# Patient Record
Sex: MALE | Race: WHITE | NOT HISPANIC OR LATINO | Employment: UNEMPLOYED | URBAN - METROPOLITAN AREA
[De-identification: names, ages, dates, MRNs, and addresses within clinical notes are randomized per-mention and may not be internally consistent; named-entity substitution may affect disease eponyms.]

---

## 2019-09-03 ENCOUNTER — OFFICE VISIT (OUTPATIENT)
Dept: SURGERY | Facility: CLINIC | Age: 59
End: 2019-09-03
Payer: COMMERCIAL

## 2019-09-03 VITALS
HEART RATE: 76 BPM | WEIGHT: 170.44 LBS | HEIGHT: 69 IN | BODY MASS INDEX: 25.24 KG/M2 | SYSTOLIC BLOOD PRESSURE: 133 MMHG | DIASTOLIC BLOOD PRESSURE: 80 MMHG

## 2019-09-03 DIAGNOSIS — K50.019 CROHN'S DISEASE OF ILEUM WITH COMPLICATION: Primary | ICD-10-CM

## 2019-09-03 DIAGNOSIS — K50.012 CROHN'S DISEASE OF ILEUM WITH INTESTINAL OBSTRUCTION: ICD-10-CM

## 2019-09-03 PROCEDURE — 99999 PR PBB SHADOW E&M-NEW PATIENT-LVL III: CPT | Mod: PBBFAC,,, | Performed by: COLON & RECTAL SURGERY

## 2019-09-03 PROCEDURE — 99999 PR PBB SHADOW E&M-NEW PATIENT-LVL III: ICD-10-PCS | Mod: PBBFAC,,, | Performed by: COLON & RECTAL SURGERY

## 2019-09-03 PROCEDURE — 99205 OFFICE O/P NEW HI 60 MIN: CPT | Mod: S$GLB,,, | Performed by: COLON & RECTAL SURGERY

## 2019-09-03 PROCEDURE — 99205 PR OFFICE/OUTPT VISIT, NEW, LEVL V, 60-74 MIN: ICD-10-PCS | Mod: S$GLB,,, | Performed by: COLON & RECTAL SURGERY

## 2019-09-03 RX ORDER — AZATHIOPRINE 50 MG/1
TABLET ORAL
Status: ON HOLD | COMMUNITY
Start: 2019-06-14 | End: 2020-09-08 | Stop reason: HOSPADM

## 2019-09-03 RX ORDER — BUPRENORPHINE 10 UG/H
PATCH TRANSDERMAL
COMMUNITY
Start: 2019-08-19

## 2019-09-03 RX ORDER — HYDROCODONE BITARTRATE AND ACETAMINOPHEN 10; 325 MG/1; MG/1
1 TABLET ORAL EVERY 6 HOURS PRN
Status: ON HOLD | COMMUNITY
End: 2020-10-07 | Stop reason: HOSPADM

## 2019-09-03 NOTE — LETTER
September 3, 2019      Peter J. Bernheim, MD  [address] [facility] [place] [address]           [facility]  [address]  [facility] [address]  Phone: [phone]          Patient: Daquan Jansen   MR Number: [id number]   YOB: 1960   Date of Visit: 9/3/2019       Dear Dr. Peter J. Bernheim:    Thank you for referring Daquan Jansen to me for evaluation. Attached you will find relevant portions of my assessment and plan of care.    If you have questions, please do not hesitate to call me. I look forward to following Daquan Jansen along with you.    Sincerely,    Jorge Alberto Leal MD    Enclosure  CC:  No Recipients    If you would like to receive this communication electronically, please contact [email or web address] or [phone] to request more information on [employer] Link access.    For providers and/or their staff who would like to refer a patient to [facility], please contact us through our one-stop-shop provider referral line, [facility], at [phone].    If you feel you have received this communication in error or would no longer like to receive these types of communications, please e-mail [email or web address]

## 2019-09-03 NOTE — PROGRESS NOTES
CRS Office Visit History and Physical    Referring Md:   Peter J. Bernheim, Md  [address] [place] [facility] [address]    SUBJECTIVE:     Chief Complaint: Crohn's disease with bloating and abdominal pain.    History of Present Illness:  Patient is a 59 y.o. male presents on referral from Peter J Bernheim, MD.  His daughter, Melody, accompanies him to his clinic visit today.    The patient reports that he was diagnosed with Crohn's disease approximately 25 year ago.  His initial presentation was vomiting, weight loss, and abdominal pain.  He was initially treated with corticosteroids and then transitioned to Humira.  Currently, he is on Remicade every 8 weeks and azathioprine.  It is reported that he is prescribed to take 150 mg of azathioprine but he has only been taking 75 mg because he does not think the increased dose made a difference.    Currently, the patient describes symptoms of bloating and midline abdominal pain.  He is usually having 1 bowel movement per day, but intermittent diarrhea consisting of 3-4 semi-formed stools per day with mucous but no blood.  These symptoms have been happening for approximately the last 1 year.  About 1.5 years ago he reports he was admitted to an outside hospital with a flare with symptoms of nausea/vomiting and abdominal pain which was able to resolve with IV corticosteroids followed by a steroid taper.    The patient has not been on prescribed corticosteroids since that time over a year ago, but he does sometimes self take intermittent prednisone including a dose approximately 2 weeks ago that helped alleviate his symptoms.  The patient denies that he has had any recent fevers/chills.    Nutritionally, the patient relates that he his on a self prescribed low fiber diet and has had weight loss of approximately 16 lbs over the last 1 year (185 lbs to 169 lbs)and he feels that he is losing muscle mass.    Functionally, he is on disability due to back and right hip pain for  which he is on chronic opioids.  The patient describes that he can walk 2 city blocks without chest pain but would be unable to walk up a couple of flights of stairs secondary to his musculoskeletal pain.  He does report an episode approximately 9 months ago of chest pain and sweating after activity for which he never had evaluation.  The daughter and patient report he had a cardiac stress test in approximately 2006 which was normal.  He is a former smoker with a 33 pack year history.    Past Medical History:   Diagnosis Date    Arthritis - hip     Chronic pain     Chronic, continuous use of opioids     Crohn's disease     Restless leg syndrome     Spinal stenosis        Past Surgical History:   Procedure Laterality Date    APPENDECTOMY      Open, 29 years ago    LUMBAR FUSION         Review of patient's allergies indicates:   Allergen Reactions    Aspirin Swelling       Current Outpatient Medications on File Prior to Visit   Medication Sig Dispense Refill    azaTHIOprine (IMURAN) 50 mg Tab       buprenorphine (BUTRANS) 10 mcg/hour PTWK       HYDROcodone-acetaminophen (NORCO)  mg per tablet Take 1 tablet by mouth every 6 (six) hours as needed.      infliximab (REMICADE IV) Inject into the vein.       No current facility-administered medications on file prior to visit.        History reviewed. No pertinent family history.    Social History     Tobacco Use    Smoking status: Current Every Day Smoker     Packs/day: 1.50     Years: 22.00     Pack years: 33.00    Smokeless tobacco: Never Used   Substance Use Topics    Alcohol use: Not Currently    Drug use: Not on file        Review of Systems:  ROS:  GENERAL: No fever, chills, fatigability; + weight loss.  Integument:  No rashes, redness, icterus  CHEST: Denies GARCIA, cyanosis, wheezing, cough and sputum production.  CARDIOVASCULAR: + history of chest pain, No PND, orthopnea or reduced exercise tolerance.  GI:  + abd pain, dysphagia, nausea,  "vomiting, no hematemesis, no rectal pain  : Denies burning on urination, no hematuria, no bacteriuria  MSK:  No deformities, swelling, joint pain swelling  Neurologic:  No HAs, seizures, weakness, paresthesias, gait problems      OBJECTIVE:     Vital Signs (Most Recent)  /80 (BP Location: Left arm, Patient Position: Sitting, BP Method: Large (Automatic))   Pulse 76   Ht 5' 9" (1.753 m)   Wt 77.3 kg (170 lb 6.7 oz)   BMI 25.17 kg/m²     Physical Exam:  General: Awake, alert, no distress   Skin: warm and dry  HEENT: anicteric, normocephalic,  Neck trachea midline  Chest symmetric, nl excursions, no retractions  Respiratory: respirations are even and unlabored  COR RRR   Abdomen - inspection reveals well healed surgical scar (appendectomy)  soft mild tenderness in RLQ, non-distended.  Palpable mass in RLQ    Colonoscopy findings 8/14/2019 at outside hospital:   1. Prominent and edematous ileocecal valve without evidence of active inflammation, biopsied  2. Diverticulosis from transverse colon distally and mainly concentrated in the last 20 cm of sigmoid colon  3. Small grade 1 internal hemorrhoids    Pathology 8/14/2019: Benign colonic mucosa with minimal features of chronicity  No evidence of active colitis, dysplasia, or malignancy    ASSESSMENT/PLAN:   1. Crohn's disease and symptomatic stricture of terminal ileum (inflammatory vs fibrostenotic)  2. Medication non-adherence  3. Severe protein-calorie malnutrition with loss of nearly 10% body mass over the last year.    Recommend  1. Obtain MRE to evaluate for active inflammation vs fibrostenotic disease and assess for synchronous lesions  2. Obtain nutrition labs (Prealbumin and CMP) and inflammatory markers (CRP, CBC) to corroborate clinic evidence of malnutrition and assess inflammatory vs fibrostenotic disease.  3. Start high protein nutritional supplements and multivitamin  4. Follow up in 1-2 weeks    I have personally obtained a history and " performed a physical exam with and independent to my resident and discussed the findings and plan with the patient.  I agree with the above findings and plan with the following exceptions:  None    H, Jorge Alberto Arreola MD, FACS, FASCRS  Staff Surgeon  Dept of Colon and Rectal Surgery  [facility] [place], [place]

## 2019-09-11 ENCOUNTER — OFFICE VISIT (OUTPATIENT)
Dept: SURGERY | Facility: CLINIC | Age: 59
End: 2019-09-11
Payer: COMMERCIAL

## 2019-09-11 ENCOUNTER — HOSPITAL ENCOUNTER (OUTPATIENT)
Dept: RADIOLOGY | Facility: HOSPITAL | Age: 59
Discharge: HOME | End: 2019-09-11
Attending: COLON & RECTAL SURGERY
Payer: COMMERCIAL

## 2019-09-11 VITALS
HEART RATE: 80 BPM | HEIGHT: 69 IN | SYSTOLIC BLOOD PRESSURE: 116 MMHG | BODY MASS INDEX: 25.08 KG/M2 | DIASTOLIC BLOOD PRESSURE: 72 MMHG | WEIGHT: 169.31 LBS

## 2019-09-11 DIAGNOSIS — K50.019 CROHN'S DISEASE OF ILEUM WITH COMPLICATION: Primary | ICD-10-CM

## 2019-09-11 DIAGNOSIS — K50.019 CROHN'S DISEASE OF ILEUM WITH COMPLICATION: ICD-10-CM

## 2019-09-11 PROCEDURE — 99999 PR PBB SHADOW E&M-EST. PATIENT-LVL III: CPT | Mod: PBBFAC,,, | Performed by: COLON & RECTAL SURGERY

## 2019-09-11 PROCEDURE — 99213 PR OFFICE/OUTPT VISIT, EST, LEVL III, 20-29 MIN: ICD-10-PCS | Mod: S$GLB,,, | Performed by: COLON & RECTAL SURGERY

## 2019-09-11 PROCEDURE — 99999 PR PBB SHADOW E&M-EST. PATIENT-LVL III: ICD-10-PCS | Mod: PBBFAC,,, | Performed by: COLON & RECTAL SURGERY

## 2019-09-11 PROCEDURE — A9585 GADOBUTROL INJECTION: HCPCS | Performed by: COLON & RECTAL SURGERY

## 2019-09-11 PROCEDURE — 72197 MRI PELVIS W/O & W/DYE: CPT | Mod: TC

## 2019-09-11 PROCEDURE — 74183 MRI ABD W/O CNTR FLWD CNTR: CPT | Mod: 26,,, | Performed by: RADIOLOGY

## 2019-09-11 PROCEDURE — 72197 MRI ENTEROGRAPHY: ICD-10-PCS | Mod: 26,,, | Performed by: RADIOLOGY

## 2019-09-11 PROCEDURE — 74183 MRI ENTEROGRAPHY: ICD-10-PCS | Mod: 26,,, | Performed by: RADIOLOGY

## 2019-09-11 PROCEDURE — 25500020 PHARM REV CODE 255: Performed by: COLON & RECTAL SURGERY

## 2019-09-11 PROCEDURE — 72197 MRI PELVIS W/O & W/DYE: CPT | Mod: 26,,, | Performed by: RADIOLOGY

## 2019-09-11 PROCEDURE — 99213 OFFICE O/P EST LOW 20 MIN: CPT | Mod: S$GLB,,, | Performed by: COLON & RECTAL SURGERY

## 2019-09-11 RX ORDER — GADOBUTROL 604.72 MG/ML
10 INJECTION INTRAVENOUS
Status: COMPLETED | OUTPATIENT
Start: 2019-09-11 | End: 2019-09-11

## 2019-09-11 RX ADMIN — GADOBUTROL 10 ML: 604.72 INJECTION INTRAVENOUS at 01:09

## 2019-09-11 NOTE — PROGRESS NOTES
HPI:  Daquan Jansen is a 59 y.o. male with history of Crohn's ileitis who presents in follow-up after obtaining inflammatory labs and MRE.    The patient was referred from Peter J Bernheim, MD.  His son-in-law, accompanies him to his clinic visit today.     From previous visit:  He was diagnosed with Crohn's disease approximately 25 year ago.  His initial presentation was vomiting, weight loss, and abdominal pain.  He was initially treated with corticosteroids and then transitioned to Humira.  Currently, he is on Remicade every 8 weeks and azathioprine.  It is reported that he is prescribed to take 150 mg of azathioprine but he has only been taking 75 mg because he does not think the increased dose made a difference.     Currently, the patient describes symptoms of bloating and midline abdominal pain.  He is usually having 1 bowel movement per day, but intermittent diarrhea consisting of 3-4 semi-formed stools per day with mucous but no blood.  These symptoms have been happening for approximately the last 1 year.  About 1.5 years ago he reports he was admitted to an outside hospital with a flare with symptoms of nausea/vomiting and abdominal pain which was able to resolve with IV corticosteroids followed by a steroid taper.     The patient has not been on prescribed corticosteroids since that time over a year ago, but he does sometimes self take intermittent prednisone including a dose approximately 2 weeks ago that helped alleviate his symptoms.  The patient denies that he has had any recent fevers/chills.     Nutritionally, the patient relates that he his on a self prescribed low fiber diet and has had weight loss of approximately 16 lbs over the last 1 year (185 lbs to 169 lbs)and he feels that he is losing muscle mass.     Functionally, he is on disability due to back and right hip pain for which he is on chronic opioids.  The patient describes that he can walk 2 city blocks without chest pain but would be  unable to walk up a couple of flights of stairs secondary to his musculoskeletal pain.  He does report an episode approximately 9 months ago of chest pain and sweating after activity for which he never had evaluation.  The daughter and patient report he had a cardiac stress test in approximately 2006 which was normal.  He is a former smoker with a 33 pack year history.     Interval update:  Symptoms of abdominal pain and bloating are stable.  He is not having any vomiting.  He continues to have mucoid stools.  The patient has not started taking nutritional supplements.  He continues to take 1/2 his prescribed azathioprine dose.  He denies taking any additional unprescribed corticosteroids.      Past Medical History:   Diagnosis Date    Arthritis - hip     Chronic pain     Chronic, continuous use of opioids     Crohn's disease     Restless leg syndrome     Spinal stenosis         Past Surgical History:   Procedure Laterality Date    APPENDECTOMY      Open, 29 years ago    LUMBAR FUSION         Review of patient's allergies indicates:   Allergen Reactions    Aspirin Swelling       No family history on file.    Social History     Socioeconomic History    Marital status: Unknown     Spouse name: Not on file    Number of children: Not on file    Years of education: Not on file    Highest education level: Not on file   Occupational History    Not on file   Social Needs    Financial resource strain: Not on file    Food insecurity:     Worry: Not on file     Inability: Not on file    Transportation needs:     Medical: Not on file     Non-medical: Not on file   Tobacco Use    Smoking status: Former Smoker     Packs/day: 1.50     Years: 22.00     Pack years: 33.00    Smokeless tobacco: Current User     Types: Chew   Substance and Sexual Activity    Alcohol use: Not Currently    Drug use: Not on file    Sexual activity: Not on file   Lifestyle    Physical activity:     Days per week: Not on file      "Minutes per session: Not on file    Stress: Not on file   Relationships    Social connections:     Talks on phone: Not on file     Gets together: Not on file     Attends [employer] service: Not on file     Active member of club or organization: Not on file     Attends meetings of clubs or organizations: Not on file     Relationship status: Not on file   Other Topics Concern    Not on file   Social History Narrative    Not on file       ROS:  GENERAL: No fever, chills, fatigability or weight loss.  Integument: No rashes, redness, icterus  CHEST: Denies GARCIA, cyanosis, wheezing, cough and sputum production.  CARDIOVASCULAR: Denies chest pain, PND, orthopnea or reduced exercise tolerance.  GI: Denies abd pain, dysphagia, nausea, vomiting, no hematemesis   : Denies burning on urination, no hematuria, no bacteriuria  MSK: No deformities, swelling, joint pain swelling  Neurologic: No HAs, seizures, weakness, paresthesias, gait problems    PE:  General appearance Awake, alert, no distress  /72 (BP Location: Left arm, Patient Position: Sitting, BP Method: Large (Automatic))   Pulse 80   Ht 5' 9" (1.753 m)   Wt 76.8 kg (169 lb 5 oz)   BMI 25.00 kg/m²   Skin warm and dry  AT NC EOMI  Neck supple trachea midline   Chest symmetric, nl excursion, no retractions, breathing comfortably  EXT - no CCE  Neuro:  Mood/ affect nl, alert and oriented x 3, moves all ext's, gait nl    LABS:  WBC 5.3K  Hgb 14.8  CRP 3.0  Pre-albumin 32  Albumin 4.4    IMAGING:  MR enterography:     Impression       "Short segment of mild wall thickening with mucosal enhancement of the terminal ileum, possible low-grade enteritis/ active Crohn's disease.  No fibrostenotic component, fistula, or fluid collections."    Electronically signed by resident: Gary Mahoney MD  Date: 09/11/2019  Time: 14:38    Electronically signed by: Wade Boateng MD  Date: 09/11/2019  Time: 14:54         Assessment:  1. Crohn's ileitis with symptomatic stricture - mild " wall thickening, possible inflammatory given mucosal enhancement.  No evidence of true stricture.  No evidence proximal bowel dilatation.  Normal CRP notwithstanding.   Prior medical tx - Humira in past; azathioprine, and Remicaide most recently   2.  Wt loss with preserved prealbumin and albumin.      Plan:  I discussed MR findings with patient and son.  Given mild wall thickening without proximal dilatation and given the absence of true fibrotic stenosis on MRE I favor consideration of optimizing medical therapy.    Discussed with Dr Flower who will review chart and consider office consultation.

## 2019-09-23 ENCOUNTER — TELEPHONE (OUTPATIENT)
Dept: GASTROENTEROLOGY | Facility: CLINIC | Age: 59
End: 2019-09-23

## 2019-09-23 ENCOUNTER — TELEPHONE (OUTPATIENT)
Dept: SURGERY | Facility: CLINIC | Age: 59
End: 2019-09-23

## 2019-09-23 NOTE — TELEPHONE ENCOUNTER
Called & spoke to pt. Informed him that the overall game plan is for him to be scoped w/ a f/u 2 weeks after. Requested that pt reach out to Dr Arreola's team for endo orders. Pt expressed understanding.

## 2019-09-23 NOTE — TELEPHONE ENCOUNTER
----- Message from Ciara Masterson sent at 9/23/2019  2:34 PM CDT -----  Contact: pt  Reason:Pt spoke with the gastro dept pertaining to a scope he was told to reach back out to this dept please call to advise. Thanks    Communication:[phone]

## 2019-10-17 DIAGNOSIS — K50.919 CROHN'S DISEASE WITH COMPLICATION, UNSPECIFIED GASTROINTESTINAL TRACT LOCATION: Primary | ICD-10-CM

## 2019-11-01 ENCOUNTER — TELEPHONE (OUTPATIENT)
Dept: GASTROENTEROLOGY | Facility: CLINIC | Age: 59
End: 2019-11-01

## 2019-11-01 DIAGNOSIS — K50.919 CROHN'S DISEASE WITH COMPLICATION, UNSPECIFIED GASTROINTESTINAL TRACT LOCATION: Primary | ICD-10-CM

## 2019-11-01 DIAGNOSIS — Z12.11 SPECIAL SCREENING FOR MALIGNANT NEOPLASMS, COLON: Primary | ICD-10-CM

## 2019-11-01 RX ORDER — POLYETHYLENE GLYCOL 3350, SODIUM SULFATE ANHYDROUS, SODIUM BICARBONATE, SODIUM CHLORIDE, POTASSIUM CHLORIDE 236; 22.74; 6.74; 5.86; 2.97 G/4L; G/4L; G/4L; G/4L; G/4L
4 POWDER, FOR SOLUTION ORAL ONCE
Qty: 4000 ML | Refills: 0 | Status: SHIPPED | OUTPATIENT
Start: 2019-11-01 | End: 2019-11-01

## 2019-11-01 NOTE — TELEPHONE ENCOUNTER
Called & spoke to pt. Scheduled him for 12/4 at 11:00 am w/ 10:30 am arrival. Pt expressed understanding.

## 2019-11-01 NOTE — TELEPHONE ENCOUNTER
----- Message from Tereza Fortune sent at 11/1/2019 12:15 PM CDT -----  Contact: pt  Pt needs to schedule a appt with Dr Flower.  Dr Arreola referred him to Dr. Flower. If pt maritza not answer please leave a message.

## 2019-11-01 NOTE — TELEPHONE ENCOUNTER
Called & spoke to pt. Informed him that Dr Flower would like to scope him prior to an appt. Pt expressed understanding. Orders placed, purple sheet reviewed. Transferred pt to [id number] w/ directions to call me back on direct line once scheduled to set up f/u [id number]. Pt expressed understanding.

## 2019-11-01 NOTE — TELEPHONE ENCOUNTER
----- Message from Juli De La Cruz MA sent at 11/1/2019  4:07 PM CDT -----  Contact: [phone]  Pt wants to give some updated medial information before his procedure on 11/14/19.     [phone]

## 2019-11-01 NOTE — TELEPHONE ENCOUNTER
----- Message from Lacie Middleton sent at 11/1/2019  2:35 PM CDT -----  Contact: pt at [phone]  Ching hn-[facility] is calling to set up and appt with Dr. Flower

## 2019-11-04 ENCOUNTER — TELEPHONE (OUTPATIENT)
Dept: ENDOSCOPY | Facility: HOSPITAL | Age: 59
End: 2019-11-04

## 2019-11-04 NOTE — TELEPHONE ENCOUNTER
Attempted to reach patient to discuss regarding message below.   No answer and message was left for him to return call to Endoscopy Scheduling Department at [phone]        Mindi Vargas RN   to [facility] Endo Schedulers            11/1/19 4:25 PM   Please give pt a call. States he has updated medical information he needs to disclose prior to scope. Thanks!

## 2020-07-20 ENCOUNTER — TELEPHONE (OUTPATIENT)
Dept: ENDOSCOPY | Facility: HOSPITAL | Age: 60
End: 2020-07-20

## 2020-07-20 NOTE — TELEPHONE ENCOUNTER
Basilio Mindi,    Looks like patient was going to be a new patient with yall (see note 11/1/2019) from a referral from Dr. Arreola, he was going to be scoped by Dr. Flower then see her in clinic 2 weeks after.  He ended up having to cancelled his colonoscopy bc his insurance wouldn't cover it, He now has Medicare and wants to start the process over.    How does this Work with huong?    Thanks,  Shasta

## 2020-07-20 NOTE — TELEPHONE ENCOUNTER
----- Message from Molly Linares RN sent at 7/20/2020  9:41 AM CDT -----  Regarding: FW: Ravi    ----- Message -----  From: Danielle Alonzo  Sent: 7/20/2020   9:37 AM CDT  To: [facility] Staff  Subject: Appt                                             Pt is calling because his insurance would not pay for a procedure, Colonoscopy in the past but now has Medicare that will pay for it.  He is requesting a returned call to be scheduled for the procedure because he says his stomach is blocked up and hurting.      He can be reached at [phone].    Thank you.

## 2020-07-21 ENCOUNTER — TELEPHONE (OUTPATIENT)
Dept: SURGERY | Facility: CLINIC | Age: 60
End: 2020-07-21

## 2020-07-21 NOTE — TELEPHONE ENCOUNTER
----- Message from Miriam Velázquez sent at 7/21/2020  7:36 AM CDT -----  Regarding: speak w/ the nurse  Pt requesting to speak w/ the Nurse    Please call and advise    Phone [phone]

## 2020-07-21 NOTE — TELEPHONE ENCOUNTER
Pt says his Crohns is flaring up. Told him he will have to see Dr Flower. He will call that dept to get an appt.

## 2020-07-21 NOTE — TELEPHONE ENCOUNTER
----- Message from Juli De La Cruz MA sent at 7/21/2020  8:33 AM CDT -----  Contact: [phone]  Returning a call to Ludmila. Pt said that he would like to discuss a Chron's flare up that had him up all night [phone]

## 2020-07-21 NOTE — TELEPHONE ENCOUNTER
Left message to call Dr Flower's office if he is calling about scheduling the colonoscopy because Dr Arreola referred him to GI. If he has a question for me asked that he please call me back.

## 2020-08-03 RX ORDER — OMEPRAZOLE 40 MG/1
40 CAPSULE, DELAYED RELEASE ORAL
Status: ON HOLD | COMMUNITY
End: 2020-09-08 | Stop reason: HOSPADM

## 2020-08-03 RX ORDER — CODEINE PHOSPHATE AND GUAIFENESIN 10; 100 MG/5ML; MG/5ML
SOLUTION ORAL
Status: ON HOLD | COMMUNITY
Start: 2015-04-10 | End: 2020-09-08 | Stop reason: HOSPADM

## 2020-08-03 RX ORDER — TRAMADOL HYDROCHLORIDE 50 MG/1
50 TABLET ORAL
Status: ON HOLD | COMMUNITY
End: 2020-09-08 | Stop reason: HOSPADM

## 2020-08-03 RX ORDER — OMEPRAZOLE 20 MG/1
20 CAPSULE, DELAYED RELEASE ORAL
Status: ON HOLD | COMMUNITY
End: 2020-09-08 | Stop reason: HOSPADM

## 2020-08-03 RX ORDER — HYDROCODONE BITARTRATE AND ACETAMINOPHEN 7.5; 325 MG/1; MG/1
1 TABLET ORAL
Status: ON HOLD | COMMUNITY
End: 2020-09-08 | Stop reason: HOSPADM

## 2020-08-03 RX ORDER — ADALIMUMAB 40MG/0.8ML
40 KIT SUBCUTANEOUS
Status: ON HOLD | COMMUNITY
End: 2020-09-08 | Stop reason: HOSPADM

## 2020-08-03 RX ORDER — PREDNISONE 10 MG/1
10 TABLET ORAL
Status: ON HOLD | COMMUNITY
End: 2020-10-07 | Stop reason: HOSPADM

## 2020-08-25 ENCOUNTER — OFFICE VISIT (OUTPATIENT)
Dept: GASTROENTEROLOGY | Facility: HOSPITAL | Age: 60
End: 2020-08-25
Attending: INTERNAL MEDICINE
Payer: MEDICARE

## 2020-08-25 ENCOUNTER — TELEPHONE (OUTPATIENT)
Dept: ENDOSCOPY | Facility: HOSPITAL | Age: 60
End: 2020-08-25

## 2020-08-25 VITALS
DIASTOLIC BLOOD PRESSURE: 86 MMHG | WEIGHT: 160.25 LBS | HEART RATE: 66 BPM | SYSTOLIC BLOOD PRESSURE: 132 MMHG | HEIGHT: 69 IN | RESPIRATION RATE: 16 BRPM | OXYGEN SATURATION: 99 % | TEMPERATURE: 99 F | BODY MASS INDEX: 23.74 KG/M2

## 2020-08-25 DIAGNOSIS — R10.31 ABDOMINAL PAIN, RLQ (RIGHT LOWER QUADRANT): ICD-10-CM

## 2020-08-25 DIAGNOSIS — K50.012 CROHN'S DISEASE OF SMALL INTESTINE WITH INTESTINAL OBSTRUCTION: Primary | ICD-10-CM

## 2020-08-25 DIAGNOSIS — Z12.11 SPECIAL SCREENING FOR MALIGNANT NEOPLASMS, COLON: Primary | ICD-10-CM

## 2020-08-25 DIAGNOSIS — Z01.818 PRE-OP TESTING: Primary | ICD-10-CM

## 2020-08-25 PROCEDURE — 99204 OFFICE O/P NEW MOD 45 MIN: CPT | Mod: ,,, | Performed by: INTERNAL MEDICINE

## 2020-08-25 PROCEDURE — 99204 PR OFFICE/OUTPT VISIT, NEW, LEVL IV, 45-59 MIN: ICD-10-PCS | Mod: ,,, | Performed by: INTERNAL MEDICINE

## 2020-08-25 PROCEDURE — 3008F BODY MASS INDEX DOCD: CPT | Mod: CPTII,,, | Performed by: INTERNAL MEDICINE

## 2020-08-25 PROCEDURE — 3008F PR BODY MASS INDEX (BMI) DOCUMENTED: ICD-10-PCS | Mod: CPTII,,, | Performed by: INTERNAL MEDICINE

## 2020-08-25 RX ORDER — ACETAMINOPHEN 500 MG
1 TABLET ORAL DAILY
COMMUNITY

## 2020-08-25 RX ORDER — ACETAMINOPHEN 160 MG/5ML
200 SUSPENSION, ORAL (FINAL DOSE FORM) ORAL DAILY
COMMUNITY
End: 2020-10-21

## 2020-08-25 RX ORDER — POLYETHYLENE GLYCOL 3350, SODIUM SULFATE ANHYDROUS, SODIUM BICARBONATE, SODIUM CHLORIDE, POTASSIUM CHLORIDE 236; 22.74; 6.74; 5.86; 2.97 G/4L; G/4L; G/4L; G/4L; G/4L
4 POWDER, FOR SOLUTION ORAL ONCE
Qty: 4000 ML | Refills: 0 | Status: SHIPPED | OUTPATIENT
Start: 2020-08-25 | End: 2020-08-25

## 2020-08-25 NOTE — LETTER
August 25, 2020      DIAN Arreola MD  [address]  [facility] [address]           [facility]  [address]  [facility] [address]  Phone: [phone]  Fax: [phone]          Patient: Daquan Jansen   MR Number: [id number]   YOB: 1960   Date of Visit: 8/25/2020       Dear Dr. DIAN Arreola:    Thank you for referring Daquan Jansen to me for evaluation. Attached you will find relevant portions of my assessment and plan of care.    If you have questions, please do not hesitate to call me. I look forward to following Daquan Jansen along with you.    Sincerely,    Eduin Allan MD    Enclosure  CC:  No Recipients    If you would like to receive this communication electronically, please contact [email or web address] or [phone] to request more information on [employer] Link access.    For providers and/or their staff who would like to refer a patient to [facility], please contact us through our one-stop-shop provider referral line, [facility], at [phone].    If you feel you have received this communication in error or would no longer like to receive these types of communications, please e-mail [email or web address]

## 2020-08-25 NOTE — PROGRESS NOTES
[facility]          Inflammatory Bowel Disease New Patient Consultation Note         TODAY'S VISIT DATE:  8/25/2020    Reason for Consult:    Chief Complaint   Patient presents with    Crohn's Disease       PCP: Brayan Serrano      Referring MD:   Dr. DIAN Arreola    History of Present Illness:  Daquan Jansen who is a 59 y.o. male is being seen today at the [facility] on 08/25/2020 for inflammatory bowel disease- Crohn's disease.  He is here today for further evaluation of his history of Crohn's disease.  He has had Crohn's for a long time.  See details below.  He was supposed to have hip surgery recently but because of his ongoing gastrointestinal problems his surgeon delayed his procedure.  He reports for the last several months that he has right lower quadrant pain that can be very severe after almost every meal.  He also notes chronic issues with abdominal bloating and swelling after meals.  He denies any diarrhea and reports that he only has a bowel movement about once a day to once every 3 or 4 days and they are typically formed but sometimes can be soft.  He rarely has any blood in his stools.  He denies any vomiting but does report some mild nausea.  His biggest complaint is of the lower abdominal pain that has been persistent since his diagnosis in 1995.  He reports that sometimes when he has been on medical therapy this symptoms have improved somewhat.    IBD History:  He has a history of ileal Crohn's disease that was diagnosed in the mid 90s.  As far back as 1999 colonoscopy reports have noted a stricture at the ileocecal valve that will not allow even a pediatric colonoscope to pass into the terminal ileum.  His medical treatment has been somewhat erratic because of lapses in insurance coverage over the years.  He has had good response is to steroids in the past.  After being on steroids he was placed on azathioprine and  infliximab.  He responded well to infliximab initially but after several months he felt like it was not helping anymore and he was having a return of some of his abdominal pain.  He was then started on Humira.  He had an initial response to this as well but later felt like he lost response.  He went for several years without being on any medical therapy. In early 2018 he was started on azathioprine and later started on infliximab (2nd time this drug was used).    It is noted that when he was restarted on infliximab he did have a minor infusion reaction during his 3rd infusion but had never had any other issues since that time.    Based on the notes it sounds like he had a good response to this therapy although there were some issues with low 6 TG levels concerning for medication non adherence.  In August of 2019 he had another colonoscopy that again revealed edema of the ileocecal valve and stenosis that did not allow for passage of the colonoscope into the ileum.  He also had diverticulosis from the transverse colon distally and some small hemorrhoids.  Biopsies of the ileocecal valve did not show any active inflammatory changes.  In September 2019 he was referred to colorectal surgery for possible resection of the IC valve stricture.  An MR enterography was done which showed some changes concerning for active inflammatory disease but there was no evidence of proximal dilation of the bowel and no clear evidence of a stricture.  He was referred for further medical management but he never came to appointment.  He was seen by his primary gastroenterologist in April of this year and it was noted that he had been off of infliximab for for the last 4 months.  As of June it does not appear that this had been restarted.  Infliximab was restarted about 2 weeks ago at a dose of 5 milligrams/kilogram.  He is scheduled for his next dose in 8 weeks (not really loaded).    IBD Details:  Dx Date:  1995  Disease type/distribution:   Crohn's disease/Ileal disease  Current Treatment:  Infliximab  Start Date:  August 2020 Response:  Unclear  Optimized:  No  Adverse reactions:  None  Prior surgeries:  None  CRP Elevation: N  Disease Complications:  IC valve stricture  Extraintestinal manifestations:  None  Prior treatments:   Steroids:  Good response but not with most recent round of prednisone  5ASA:  Was on sulfasalazine at 1 point without response  IMM:  Azathioprine-no significant issues, not taking currently-normal TPMT activity  TNF Inh:  Previously on Humira-loss of response (not clear of antibodies checked); infliximab-current therapy-2 previous courses-unclear whether he has antibodies   Anti-Integrin:  None   IL 12/23:  None  KYLEIGH Inh:  None    Previous Clinical Trials:  None    Last Colonoscopy:  August 2019-IC valve stricture    Other Endoscopies:  EGD many years ago    Imaging:   MRE:  September 2019-inflammation of a 4 cm segment of the terminal ileum, no upstream dilation, no clear fibrotic stricture   CT:  None   Other:  None    Pertinent Labs:  Lab Results   Component Value Date    CRP 3.0 09/03/2019     No results found for: TTGIGA, IGA  No results found for: TSH, FREET4  No results found for: WJFZSGDQ36KV, CDCNZOTR59  No results found for: HEPBSAG, HEPBCAB, HEPCAB  No results found for: [id number]  No results found for: NIL, TBAG, TBAGNIL, MITOGENNIL, TBGOLD, TSPOTSCREN  No results found for: TPTMINTERP, TPMTRESULT  No results found for: STOOLCULTURE, FCCJBUEEVM6Q, IHMVTAFERW1Y, CDIFFICILEAN, CDIFFTOX, CDIFFICILEBY  No results found for: CALPROTECTIN    Therapeutic Drug Monitoring Labs:  No results found for: PROMETH  No results found for: ANSADAINIT, INFLIXIMAB, INFLIXINTERP    Vaccinations:  No results found for: HEPBSAB  No results found for: HEPAIGG  No results found for: VARICELLAZOS, VARICELLAINT    There is no immunization history on file for this patient.      Review of Systems  Review of Systems   Constitutional: Positive  for chills and weight loss. Negative for fever.   HENT: Negative for sore throat.    Eyes: Positive for redness. Negative for pain.   Respiratory: Negative for cough and shortness of breath.    Cardiovascular: Negative for chest pain.   Gastrointestinal: Positive for abdominal pain, heartburn and nausea. Negative for blood in stool, constipation, diarrhea and vomiting.   Genitourinary: Negative for hematuria.   Musculoskeletal: Negative for back pain.   Skin: Negative for rash.   Neurological: Negative for focal weakness.   Psychiatric/Behavioral: Negative for depression. The patient is not nervous/anxious.        Medical History:   Past Medical History:   Diagnosis Date    Arthritis - hip     Chronic pain     Chronic, continuous use of opioids     Colon polyp     Doctor said had a few small ones nothing to worry about    Crohn's disease     Food intolerance     Anything with seeds    Hyperlipemia     Restless leg syndrome     Spinal stenosis        Surgical History:  Past Surgical History:   Procedure Laterality Date    APPENDECTOMY      Open, 29 years ago    COLONOSCOPY  Had a lot of them    None    LUMBAR FUSION      UPPER GASTROINTESTINAL ENDOSCOPY  Dont remember    In hospital recordes       Family History:   Family History   Problem Relation Age of Onset    Heart disease Mother     Bone cancer Father     Diabetes Brother        Social History:   Social History     Tobacco Use    Smoking status: Former Smoker     Packs/day: 1.50     Years: 45.00     Pack years: 67.50     Types: Cigarettes, Cigars     Start date: 1975     Quit date: 2002     Years since quittin.0    Smokeless tobacco: Current User     Types: Chew    Tobacco comment: Still dip   Substance Use Topics    Alcohol use: Never    Drug use: Never       Allergies: Reviewed    Home Medications:   Medication List with Changes/Refills   Current Medications    ADALIMUMAB (HUMIRA) 40 MG/0.8 ML SYKT    Inject 40 mg  "into the skin.    AZATHIOPRINE (IMURAN) 50 MG TAB        BUPRENORPHINE (BUTRANS) 10 MCG/HOUR PTWK        CHOLECALCIFEROL, VITAMIN D3, (VITAMIN D3) 50 MCG (2,000 UNIT) CAP    Take 1 capsule by mouth once daily.    COENZYME Q10 200 MG CAPSULE    Take 200 mg by mouth once daily.    GUAIFENESIN-CODEINE 100-10 MG/5 ML (TUSSI-ORGANIDIN NR)  MG/5 ML SYRUP    5 to 10 ml q 6 hrs prn    HYDROCODONE-ACETAMINOPHEN (NORCO)  MG PER TABLET    Take 1 tablet by mouth every 6 (six) hours as needed.    HYDROCODONE-ACETAMINOPHEN (NORCO) 7.5-325 MG PER TABLET    Take 1 tablet by mouth.    INFLIXIMAB (REMICADE IV)    Inject into the vein.    OMEPRAZOLE (PRILOSEC) 20 MG CAPSULE    Take 20 mg by mouth.    OMEPRAZOLE (PRILOSEC) 40 MG CAPSULE    Take 40 mg by mouth.    PREDNISONE (DELTASONE) 10 MG TABLET    Take 10 mg by mouth.    TRAMADOL (ULTRAM) 50 MG TABLET    Take 50 mg by mouth.    UNABLE TO FIND    [employer]ants Men's formula       Physical Exam:  Vital Signs:  /86 (BP Location: Left arm, Patient Position: Sitting)   Pulse 66   Temp 98.8 °F (37.1 °C)   Resp 16   Ht 5' 9" (1.753 m)   Wt 72.7 kg (160 lb 4.4 oz)   SpO2 99%   BMI 23.67 kg/m²   Body mass index is 23.67 kg/m².    Physical Exam   Constitutional: He is oriented to person, place, and time. He appears well-developed and well-nourished. No distress.   HENT:   Head: Normocephalic and atraumatic.   Eyes: Pupils are equal, round, and reactive to light. No scleral icterus.   Neck: No thyromegaly present.   Cardiovascular: Normal rate and regular rhythm. Exam reveals no friction rub.   No murmur heard.  Pulmonary/Chest: Effort normal and breath sounds normal. He has no wheezes. He has no rales.   Abdominal: Soft. Bowel sounds are normal. He exhibits no distension and no mass. There is no abdominal tenderness. There is no rebound and no guarding.   Musculoskeletal:         General: No edema.   Lymphadenopathy:     He has no cervical adenopathy.   Neurological: " He is alert and oriented to person, place, and time.   Skin: No rash noted.   Psychiatric: He has a normal mood and affect. His behavior is normal. Judgment and thought content normal.   Nursing note and vitals reviewed.      Labs: reviewed and pertinent noted above  July 2020: mild normocytic anemia, normal PT/INR, normal CMP (albumin 4.3).  CRP equals 1.0, normal UA  February 2018:  Negative T spot  Normal TPMT activity    Assessment/Plan:  Daquan Jansen is a 59 y.o. male with ileal Crohn's disease complicated by stricture.. The following issues were addresssed:    1. Crohn's disease of small intestine with intestinal obstruction    2. Abdominal pain, RLQ (right lower quadrant)      1.  Crohn's disease:  His primary complaint sounds most consistent with obstructive type symptoms including significant abdominal bloating and right lower quadrant pain after eating as well as nausea.  He has had a significant stricture that has not allow passage of even a pediatric colonoscope since as far back as 1999.  His initial presentation in 1995 was for obstructive symptoms that responded partially to steroids.  In spite of the MR enterography that was done last year I suspect that a lot of his symptoms may be related to obstructive issues.  It sounds that even when he has been under good control with a TNF inhibitor he is still had some obstructive symptoms.  It has also been very difficult to assess his inflammatory burden because of lack of access to the terminal ileum endoscopically.  Today I recommend that we repeat his colonoscopy. We might be able to dilate the stricture at the time of colonoscopy. We will likely also need to consider a CT enterography to further assess the stricture.   If surgery is felt to be necessary I would prefer to have that done before starting on alternate therapy.  With regards to his medical management, he has just recently restarted infliximab.  Today I recommend that we check for  antibodies to infliximab.  If there antibodies present than ongoing treatment with infliximab of likely not to be very beneficial.  We discussed treatment options for the future and I think Entyvio would be a reasonable option for him.  The main question will be whether he will need surgery prior to starting Entyvio or whether we think medical therapy might be if some utility prior to considering surgical intervention.      Ex smoker:  - recommended to continue smoking cessation  - discussed in detail that Crohn's disease can worsen with smoking and may make patient more resistant to treatment    # IBD specific health maintenance:  Colon cancer surveillance:  No colonic disease    Annual:  - Eye exam:  Not done  - Skin exam (if on IMM/TNF):  Not done  - reminded pt to use sunblock/hats/sunprotective clothing  - PAP (if immunosuppressed):  Not applicable    DEXA:  Not applicable    Vitamin D:  Check today    Vaccines:   Patient unsure about vaccines    Follow up: No follow-ups on file.    Thank you again for sending Daquan Jansen to see Dr. Brando Allan today at the [facility]. Please don't hesitate to contact Dr. Allna if there are any questions regarding this evaluation, or if you have any other patients with inflammatory bowel disease for whom you would like a consultation. You can reach Dr. Allan at [phone] or by email at [email or web address]    Eduin Allan MD  Department of Gastroenterology  Inflammatory Bowel Disease          Answers for HPI/ROS submitted by the patient on 8/22/2020   mouth sores: No  trouble swallowing: No  Joint pain? : Yes

## 2020-08-25 NOTE — H&P (VIEW-ONLY)
[facility]          Inflammatory Bowel Disease New Patient Consultation Note         TODAY'S VISIT DATE:  8/25/2020    Reason for Consult:    Chief Complaint   Patient presents with    Crohn's Disease       PCP: Brayan Serrano      Referring MD:   Dr. DIAN Arreola    History of Present Illness:  Daquan Jansen who is a 59 y.o. male is being seen today at the [facility] on 08/25/2020 for inflammatory bowel disease- Crohn's disease.  He is here today for further evaluation of his history of Crohn's disease.  He has had Crohn's for a long time.  See details below.  He was supposed to have hip surgery recently but because of his ongoing gastrointestinal problems his surgeon delayed his procedure.  He reports for the last several months that he has right lower quadrant pain that can be very severe after almost every meal.  He also notes chronic issues with abdominal bloating and swelling after meals.  He denies any diarrhea and reports that he only has a bowel movement about once a day to once every 3 or 4 days and they are typically formed but sometimes can be soft.  He rarely has any blood in his stools.  He denies any vomiting but does report some mild nausea.  His biggest complaint is of the lower abdominal pain that has been persistent since his diagnosis in 1995.  He reports that sometimes when he has been on medical therapy this symptoms have improved somewhat.    IBD History:  He has a history of ileal Crohn's disease that was diagnosed in the mid 90s.  As far back as 1999 colonoscopy reports have noted a stricture at the ileocecal valve that will not allow even a pediatric colonoscope to pass into the terminal ileum.  His medical treatment has been somewhat erratic because of lapses in insurance coverage over the years.  He has had good response is to steroids in the past.  After being on steroids he was placed on azathioprine and  infliximab.  He responded well to infliximab initially but after several months he felt like it was not helping anymore and he was having a return of some of his abdominal pain.  He was then started on Humira.  He had an initial response to this as well but later felt like he lost response.  He went for several years without being on any medical therapy. In early 2018 he was started on azathioprine and later started on infliximab (2nd time this drug was used).    It is noted that when he was restarted on infliximab he did have a minor infusion reaction during his 3rd infusion but had never had any other issues since that time.    Based on the notes it sounds like he had a good response to this therapy although there were some issues with low 6 TG levels concerning for medication non adherence.  In August of 2019 he had another colonoscopy that again revealed edema of the ileocecal valve and stenosis that did not allow for passage of the colonoscope into the ileum.  He also had diverticulosis from the transverse colon distally and some small hemorrhoids.  Biopsies of the ileocecal valve did not show any active inflammatory changes.  In September 2019 he was referred to colorectal surgery for possible resection of the IC valve stricture.  An MR enterography was done which showed some changes concerning for active inflammatory disease but there was no evidence of proximal dilation of the bowel and no clear evidence of a stricture.  He was referred for further medical management but he never came to appointment.  He was seen by his primary gastroenterologist in April of this year and it was noted that he had been off of infliximab for for the last 4 months.  As of June it does not appear that this had been restarted.  Infliximab was restarted about 2 weeks ago at a dose of 5 milligrams/kilogram.  He is scheduled for his next dose in 8 weeks (not really loaded).    IBD Details:  Dx Date:  1995  Disease type/distribution:   Crohn's disease/Ileal disease  Current Treatment:  Infliximab  Start Date:  August 2020 Response:  Unclear  Optimized:  No  Adverse reactions:  None  Prior surgeries:  None  CRP Elevation: N  Disease Complications:  IC valve stricture  Extraintestinal manifestations:  None  Prior treatments:   Steroids:  Good response but not with most recent round of prednisone  5ASA:  Was on sulfasalazine at 1 point without response  IMM:  Azathioprine-no significant issues, not taking currently-normal TPMT activity  TNF Inh:  Previously on Humira-loss of response (not clear of antibodies checked); infliximab-current therapy-2 previous courses-unclear whether he has antibodies   Anti-Integrin:  None   IL 12/23:  None  KYLEIGH Inh:  None    Previous Clinical Trials:  None    Last Colonoscopy:  August 2019-IC valve stricture    Other Endoscopies:  EGD many years ago    Imaging:   MRE:  September 2019-inflammation of a 4 cm segment of the terminal ileum, no upstream dilation, no clear fibrotic stricture   CT:  None   Other:  None    Pertinent Labs:  Lab Results   Component Value Date    CRP 3.0 09/03/2019     No results found for: TTGIGA, IGA  No results found for: TSH, FREET4  No results found for: QLQFAMRB47WJ, MMFVTCSN68  No results found for: HEPBSAG, HEPBCAB, HEPCAB  No results found for: TZB61XLBS  No results found for: NIL, TBAG, TBAGNIL, MITOGENNIL, TBGOLD, TSPOTSCREN  No results found for: TPTMINTERP, TPMTRESULT  No results found for: STOOLCULTURE, BPKNKVLOHX6M, RAESSZXCIJ8P, CDIFFICILEAN, CDIFFTOX, CDIFFICILEBY  No results found for: CALPROTECTIN    Therapeutic Drug Monitoring Labs:  No results found for: PROMETH  No results found for: ANSADAINIT, INFLIXIMAB, INFLIXINTERP    Vaccinations:  No results found for: HEPBSAB  No results found for: HEPAIGG  No results found for: VARICELLAZOS, VARICELLAINT    There is no immunization history on file for this patient.      Review of Systems  Review of Systems   Constitutional: Positive  for chills and weight loss. Negative for fever.   HENT: Negative for sore throat.    Eyes: Positive for redness. Negative for pain.   Respiratory: Negative for cough and shortness of breath.    Cardiovascular: Negative for chest pain.   Gastrointestinal: Positive for abdominal pain, heartburn and nausea. Negative for blood in stool, constipation, diarrhea and vomiting.   Genitourinary: Negative for hematuria.   Musculoskeletal: Negative for back pain.   Skin: Negative for rash.   Neurological: Negative for focal weakness.   Psychiatric/Behavioral: Negative for depression. The patient is not nervous/anxious.        Medical History:   Past Medical History:   Diagnosis Date    Arthritis - hip     Chronic pain     Chronic, continuous use of opioids     Colon polyp     Doctor said had a few small ones nothing to worry about    Crohn's disease     Food intolerance     Anything with seeds    Hyperlipemia     Restless leg syndrome     Spinal stenosis        Surgical History:  Past Surgical History:   Procedure Laterality Date    APPENDECTOMY      Open, 29 years ago    COLONOSCOPY  Had a lot of them    None    LUMBAR FUSION      UPPER GASTROINTESTINAL ENDOSCOPY  Dont remember    In hospital recordes       Family History:   Family History   Problem Relation Age of Onset    Heart disease Mother     Bone cancer Father     Diabetes Brother        Social History:   Social History     Tobacco Use    Smoking status: Former Smoker     Packs/day: 1.50     Years: 45.00     Pack years: 67.50     Types: Cigarettes, Cigars     Start date: 1975     Quit date: 2002     Years since quittin.0    Smokeless tobacco: Current User     Types: Chew    Tobacco comment: Still dip   Substance Use Topics    Alcohol use: Never    Drug use: Never       Allergies: Reviewed    Home Medications:   Medication List with Changes/Refills   Current Medications    ADALIMUMAB (HUMIRA) 40 MG/0.8 ML SYKT    Inject 40 mg  "into the skin.    AZATHIOPRINE (IMURAN) 50 MG TAB        BUPRENORPHINE (BUTRANS) 10 MCG/HOUR PTWK        CHOLECALCIFEROL, VITAMIN D3, (VITAMIN D3) 50 MCG (2,000 UNIT) CAP    Take 1 capsule by mouth once daily.    COENZYME Q10 200 MG CAPSULE    Take 200 mg by mouth once daily.    GUAIFENESIN-CODEINE 100-10 MG/5 ML (TUSSI-ORGANIDIN NR)  MG/5 ML SYRUP    5 to 10 ml q 6 hrs prn    HYDROCODONE-ACETAMINOPHEN (NORCO)  MG PER TABLET    Take 1 tablet by mouth every 6 (six) hours as needed.    HYDROCODONE-ACETAMINOPHEN (NORCO) 7.5-325 MG PER TABLET    Take 1 tablet by mouth.    INFLIXIMAB (REMICADE IV)    Inject into the vein.    OMEPRAZOLE (PRILOSEC) 20 MG CAPSULE    Take 20 mg by mouth.    OMEPRAZOLE (PRILOSEC) 40 MG CAPSULE    Take 40 mg by mouth.    PREDNISONE (DELTASONE) 10 MG TABLET    Take 10 mg by mouth.    TRAMADOL (ULTRAM) 50 MG TABLET    Take 50 mg by mouth.    UNABLE TO FIND    Smarty Pants Men's formula       Physical Exam:  Vital Signs:  /86 (BP Location: Left arm, Patient Position: Sitting)   Pulse 66   Temp 98.8 °F (37.1 °C)   Resp 16   Ht 5' 9" (1.753 m)   Wt 72.7 kg (160 lb 4.4 oz)   SpO2 99%   BMI 23.67 kg/m²   Body mass index is 23.67 kg/m².    Physical Exam   Constitutional: He is oriented to person, place, and time. He appears well-developed and well-nourished. No distress.   HENT:   Head: Normocephalic and atraumatic.   Eyes: Pupils are equal, round, and reactive to light. No scleral icterus.   Neck: No thyromegaly present.   Cardiovascular: Normal rate and regular rhythm. Exam reveals no friction rub.   No murmur heard.  Pulmonary/Chest: Effort normal and breath sounds normal. He has no wheezes. He has no rales.   Abdominal: Soft. Bowel sounds are normal. He exhibits no distension and no mass. There is no abdominal tenderness. There is no rebound and no guarding.   Musculoskeletal:         General: No edema.   Lymphadenopathy:     He has no cervical adenopathy.   Neurological: " He is alert and oriented to person, place, and time.   Skin: No rash noted.   Psychiatric: He has a normal mood and affect. His behavior is normal. Judgment and thought content normal.   Nursing note and vitals reviewed.      Labs: reviewed and pertinent noted above  July 2020: mild normocytic anemia, normal PT/INR, normal CMP (albumin 4.3).  CRP equals 1.0, normal UA  February 2018:  Negative T spot  Normal TPMT activity    Assessment/Plan:  Daquan Jansen is a 59 y.o. male with ileal Crohn's disease complicated by stricture.. The following issues were addresssed:    1. Crohn's disease of small intestine with intestinal obstruction    2. Abdominal pain, RLQ (right lower quadrant)      1.  Crohn's disease:  His primary complaint sounds most consistent with obstructive type symptoms including significant abdominal bloating and right lower quadrant pain after eating as well as nausea.  He has had a significant stricture that has not allow passage of even a pediatric colonoscope since as far back as 1999.  His initial presentation in 1995 was for obstructive symptoms that responded partially to steroids.  In spite of the MR enterography that was done last year I suspect that a lot of his symptoms may be related to obstructive issues.  It sounds that even when he has been under good control with a TNF inhibitor he is still had some obstructive symptoms.  It has also been very difficult to assess his inflammatory burden because of lack of access to the terminal ileum endoscopically.  Today I recommend that we repeat his colonoscopy. We might be able to dilate the stricture at the time of colonoscopy. We will likely also need to consider a CT enterography to further assess the stricture.   If surgery is felt to be necessary I would prefer to have that done before starting on alternate therapy.  With regards to his medical management, he has just recently restarted infliximab.  Today I recommend that we check for  antibodies to infliximab.  If there antibodies present than ongoing treatment with infliximab of likely not to be very beneficial.  We discussed treatment options for the future and I think Entyvio would be a reasonable option for him.  The main question will be whether he will need surgery prior to starting Entyvio or whether we think medical therapy might be if some utility prior to considering surgical intervention.      Ex smoker:  - recommended to continue smoking cessation  - discussed in detail that Crohn's disease can worsen with smoking and may make patient more resistant to treatment    # IBD specific health maintenance:  Colon cancer surveillance:  No colonic disease    Annual:  - Eye exam:  Not done  - Skin exam (if on IMM/TNF):  Not done  - reminded pt to use sunblock/hats/sunprotective clothing  - PAP (if immunosuppressed):  Not applicable    DEXA:  Not applicable    Vitamin D:  Check today    Vaccines:   Patient unsure about vaccines    Follow up: No follow-ups on file.    Thank you again for sending Daquan Jansen to see Dr. Brando Allan today at the [facility]. Please don't hesitate to contact Dr. Allan if there are any questions regarding this evaluation, or if you have any other patients with inflammatory bowel disease for whom you would like a consultation. You can reach Dr. Allan at [phone] or by email at [email or web address]    Eduin Allan MD  Department of Gastroenterology  Inflammatory Bowel Disease          Answers for HPI/ROS submitted by the patient on 8/22/2020   mouth sores: No  trouble swallowing: No  Joint pain? : Yes

## 2020-08-28 ENCOUNTER — PATIENT MESSAGE (OUTPATIENT)
Dept: ENDOSCOPY | Facility: HOSPITAL | Age: 60
End: 2020-08-28

## 2020-08-28 NOTE — TELEPHONE ENCOUNTER
Call and spoke to pt he did not understanding lab results. Advise pt Dr Allan was out the office until Monday. Will call him on Monday to discuss labs results   Patient expressed understanding

## 2020-08-31 NOTE — TELEPHONE ENCOUNTER
Pt verbalized understanding of getting colonoscopy first then Dr. Allan will be ordering a CT scan to further evaluate.  Labs sent to Dr. Bernheim per pts' request. Still awaiting remicade level.

## 2020-09-05 ENCOUNTER — LAB VISIT (OUTPATIENT)
Dept: FAMILY MEDICINE | Facility: CLINIC | Age: 60
End: 2020-09-05
Payer: MEDICARE

## 2020-09-05 DIAGNOSIS — Z01.818 PRE-OP TESTING: ICD-10-CM

## 2020-09-05 PROCEDURE — U0003 INFECTIOUS AGENT DETECTION BY NUCLEIC ACID (DNA OR RNA); SEVERE ACUTE RESPIRATORY SYNDROME CORONAVIRUS 2 (SARS-COV-2) (CORONAVIRUS DISEASE [COVID-19]), AMPLIFIED PROBE TECHNIQUE, MAKING USE OF HIGH THROUGHPUT TECHNOLOGIES AS DESCRIBED BY CMS-2020-01-R: HCPCS

## 2020-09-06 LAB — SARS-COV-2 RNA RESP QL NAA+PROBE: NOT DETECTED

## 2020-09-08 ENCOUNTER — HOSPITAL ENCOUNTER (OUTPATIENT)
Facility: HOSPITAL | Age: 60
Discharge: HOME | End: 2020-09-08
Attending: INTERNAL MEDICINE | Admitting: INTERNAL MEDICINE
Payer: MEDICARE

## 2020-09-08 ENCOUNTER — ANESTHESIA (OUTPATIENT)
Dept: ENDOSCOPY | Facility: HOSPITAL | Age: 60
End: 2020-09-08
Payer: MEDICARE

## 2020-09-08 ENCOUNTER — ANESTHESIA EVENT (OUTPATIENT)
Dept: ENDOSCOPY | Facility: HOSPITAL | Age: 60
End: 2020-09-08
Payer: MEDICARE

## 2020-09-08 VITALS
RESPIRATION RATE: 16 BRPM | TEMPERATURE: 98 F | DIASTOLIC BLOOD PRESSURE: 83 MMHG | HEART RATE: 55 BPM | HEIGHT: 69 IN | SYSTOLIC BLOOD PRESSURE: 134 MMHG | BODY MASS INDEX: 24.44 KG/M2 | OXYGEN SATURATION: 100 % | WEIGHT: 165 LBS

## 2020-09-08 DIAGNOSIS — K50.90 CROHN'S DISEASE: ICD-10-CM

## 2020-09-08 PROCEDURE — 45380 COLONOSCOPY AND BIOPSY: CPT | Mod: ,,, | Performed by: INTERNAL MEDICINE

## 2020-09-08 PROCEDURE — 45380 COLONOSCOPY AND BIOPSY: CPT | Performed by: INTERNAL MEDICINE

## 2020-09-08 PROCEDURE — 37000009 HC ANESTHESIA EA ADD 15 MINS: Performed by: INTERNAL MEDICINE

## 2020-09-08 PROCEDURE — 27201012 HC FORCEPS, HOT/COLD, DISP: Performed by: INTERNAL MEDICINE

## 2020-09-08 PROCEDURE — 88305 TISSUE EXAM BY PATHOLOGIST: ICD-10-PCS | Mod: 26,,, | Performed by: PATHOLOGY

## 2020-09-08 PROCEDURE — E9220 PRA ENDO ANESTHESIA: HCPCS | Mod: ,,, | Performed by: NURSE ANESTHETIST, CERTIFIED REGISTERED

## 2020-09-08 PROCEDURE — 63600175 PHARM REV CODE 636 W HCPCS: Performed by: NURSE ANESTHETIST, CERTIFIED REGISTERED

## 2020-09-08 PROCEDURE — 88305 TISSUE EXAM BY PATHOLOGIST: CPT | Mod: 26,,, | Performed by: PATHOLOGY

## 2020-09-08 PROCEDURE — 88305 TISSUE EXAM BY PATHOLOGIST: CPT | Performed by: PATHOLOGY

## 2020-09-08 PROCEDURE — 45380 PR COLONOSCOPY,BIOPSY: ICD-10-PCS | Mod: ,,, | Performed by: INTERNAL MEDICINE

## 2020-09-08 PROCEDURE — 25000003 PHARM REV CODE 250: Performed by: INTERNAL MEDICINE

## 2020-09-08 PROCEDURE — 37000008 HC ANESTHESIA 1ST 15 MINUTES: Performed by: INTERNAL MEDICINE

## 2020-09-08 PROCEDURE — E9220 PRA ENDO ANESTHESIA: ICD-10-PCS | Mod: ,,, | Performed by: NURSE ANESTHETIST, CERTIFIED REGISTERED

## 2020-09-08 RX ORDER — PROPOFOL 10 MG/ML
VIAL (ML) INTRAVENOUS CONTINUOUS PRN
Status: DISCONTINUED | OUTPATIENT
Start: 2020-09-08 | End: 2020-09-08

## 2020-09-08 RX ORDER — SODIUM CHLORIDE 9 MG/ML
INJECTION, SOLUTION INTRAVENOUS CONTINUOUS
Status: DISCONTINUED | OUTPATIENT
Start: 2020-09-08 | End: 2020-09-08 | Stop reason: HOSPADM

## 2020-09-08 RX ORDER — ROSUVASTATIN CALCIUM 20 MG/1
20 TABLET, COATED ORAL DAILY
COMMUNITY
End: 2020-10-21

## 2020-09-08 RX ORDER — LIDOCAINE HCL/PF 100 MG/5ML
SYRINGE (ML) INTRAVENOUS
Status: DISCONTINUED | OUTPATIENT
Start: 2020-09-08 | End: 2020-09-08

## 2020-09-08 RX ORDER — PROPOFOL 10 MG/ML
VIAL (ML) INTRAVENOUS
Status: DISCONTINUED | OUTPATIENT
Start: 2020-09-08 | End: 2020-09-08

## 2020-09-08 RX ADMIN — PROPOFOL 150 MCG/KG/MIN: 10 INJECTION, EMULSION INTRAVENOUS at 10:09

## 2020-09-08 RX ADMIN — Medication 80 MG: at 10:09

## 2020-09-08 RX ADMIN — SODIUM CHLORIDE: 0.9 INJECTION, SOLUTION INTRAVENOUS at 10:09

## 2020-09-08 RX ADMIN — PROPOFOL 80 MG: 10 INJECTION, EMULSION INTRAVENOUS at 10:09

## 2020-09-08 NOTE — ANESTHESIA PREPROCEDURE EVALUATION
09/08/2020  Pre-operative evaluation for Procedure(s) (LRB):  COLONOSCOPY (N/A)    Daquan Jansen is a 60 y.o. male     Patient Active Problem List   Diagnosis    Crohn's disease    Restless legs syndrome (RLS)       Review of patient's allergies indicates:  No Known Allergies    No current facility-administered medications on file prior to encounter.      Current Outpatient Medications on File Prior to Encounter   Medication Sig Dispense Refill    buprenorphine (BUTRANS) 10 mcg/hour PTWK       cholecalciferol, vitamin D3, (VITAMIN D3) 50 mcg (2,000 unit) Cap Take 1 capsule by mouth once daily.      HYDROcodone-acetaminophen (NORCO)  mg per tablet Take 1 tablet by mouth every 6 (six) hours as needed.      predniSONE (DELTASONE) 10 MG tablet Take 10 mg by mouth.      rosuvastatin (CRESTOR) 20 MG tablet Take 20 mg by mouth once daily.      adalimumab (HUMIRA) 40 mg/0.8 mL SyKt Inject 40 mg into the skin.      azaTHIOprine (IMURAN) 50 mg Tab       coenzyme Q10 200 mg capsule Take 200 mg by mouth once daily.      guaifenesin-codeine 100-10 mg/5 ml (TUSSI-ORGANIDIN NR)  mg/5 mL syrup 5 to 10 ml q 6 hrs prn      HYDROcodone-acetaminophen (NORCO) 7.5-325 mg per tablet Take 1 tablet by mouth.      infliximab (REMICADE IV) Inject into the vein.      omeprazole (PRILOSEC) 20 MG capsule Take 20 mg by mouth.      omeprazole (PRILOSEC) 40 MG capsule Take 40 mg by mouth.      traMADoL (ULTRAM) 50 mg tablet Take 50 mg by mouth.      UNABLE TO FIND Smarty Pants Men's formula         Past Surgical History:   Procedure Laterality Date    APPENDECTOMY      Open, 29 years ago    COLONOSCOPY  Had a lot of them    None    LUMBAR FUSION      UPPER GASTROINTESTINAL ENDOSCOPY  Dont remember    In hospital recordes       Social History     Socioeconomic History    Marital status:      Spouse  name: Not on file    Number of children: Not on file    Years of education: Not on file    Highest education level: Not on file   Occupational History    Not on file   Social Needs    Financial resource strain: Not very hard    Food insecurity     Worry: Never true     Inability: Never true    Transportation needs     Medical: No     Non-medical: No   Tobacco Use    Smoking status: Former Smoker     Packs/day: 1.50     Years: 45.00     Pack years: 67.50     Types: Cigarettes, Cigars     Start date: 1975     Quit date: 2002     Years since quittin.0    Smokeless tobacco: Current User     Types: Chew    Tobacco comment: Still dip   Substance and Sexual Activity    Alcohol use: Never     Frequency: Never     Drinks per session: Patient refused     Binge frequency: Never    Drug use: Never    Sexual activity: Not Currently     Partners: Female     Birth control/protection: None     Comment: None   Lifestyle    Physical activity     Days per week: 5 days     Minutes per session: 150+ min    Stress: Only a little   Relationships    Social connections     Talks on phone: More than three times a week     Gets together: Once a week     Attends Confucianism service: Not on file     Active member of club or organization: No     Attends meetings of clubs or organizations: Never     Relationship status:    Other Topics Concern    Not on file   Social History Narrative    Not on file         CBC: No results for input(s): WBC, RBC, HGB, HCT, PLT, MCV, MCH, MCHC in the last 72 hours.    CMP: No results for input(s): NA, K, CL, CO2, BUN, CREATININE, GLU, MG, PHOS, CALCIUM, ALBUMIN, PROT, ALKPHOS, ALT, AST, BILITOT in the last 72 hours.    INR  No results for input(s): PT, INR, PROTIME, APTT in the last 72 hours.        Diagnostic Studies:      EKD Echo:  No results found for this or any previous visit.      Anesthesia Evaluation    I have reviewed the Patient Summary Reports.    I have  reviewed the Nursing Notes. I have reviewed the NPO Status.      Review of Systems  Anesthesia Hx:  No problems with previous Anesthesia  History of prior surgery of interest to airway management or planning: Denies Family Hx of Anesthesia complications.   Denies Personal Hx of Anesthesia complications.   Hematology/Oncology:         -- Denies Anemia:   Cardiovascular:   Exercise tolerance: good Hypertension Denies CAD.       Pulmonary:   Denies COPD.  Denies Asthma.  Denies Sleep Apnea.    Renal/:   Denies Chronic Renal Disease.     Hepatic/GI:   Denies GERD. Denies Liver Disease.    Neurological:   Denies TIA. Denies CVA. Denies Seizures.    Endocrine:   Denies Diabetes.        Physical Exam  General:  Well nourished    Airway/Jaw/Neck:  Airway Findings: Mouth Opening: Normal Tongue: Normal  General Airway Assessment: Adult  Mallampati: II  Improves to II with phonation.  TM Distance: Normal, at least 6 cm  Jaw/Neck Findings:  Neck ROM: Normal ROM      Dental:  Dental Findings: In tact   Chest/Lungs:  Chest/Lungs Findings: Clear to auscultation, Normal Respiratory Rate     Heart/Vascular:  Heart Findings: Rate: Normal  Rhythm: Regular Rhythm  Sounds: Normal        Mental Status:  Mental Status Findings:  Cooperative, Alert and Oriented         Anesthesia Plan  Type of Anesthesia, risks & benefits discussed:  Anesthesia Type:  general  Patient's Preference:   Intra-op Monitoring Plan: standard ASA monitors  Intra-op Monitoring Plan Comments:   Post Op Pain Control Plan: per primary service following discharge from PACU  Post Op Pain Control Plan Comments:   Induction:   IV  Beta Blocker:  Patient is not currently on a Beta-Blocker (No further documentation required).       Informed Consent: Patient understands risks and agrees with Anesthesia plan.  Questions answered.   ASA Score: 2     Day of Surgery Review of History & Physical:    H&P update referred to the provider.         Ready For Surgery From Anesthesia  Perspective.

## 2020-09-08 NOTE — TRANSFER OF CARE
"Anesthesia Transfer of Care Note    Patient: Daquan Jansen    Procedure(s) Performed: Procedure(s) (LRB):  COLONOSCOPY (N/A)    Patient location: PACU    Anesthesia Type: general    Transport from OR: Transported from OR on room air with adequate spontaneous ventilation    Post pain: adequate analgesia    Post assessment: no apparent anesthetic complications and tolerated procedure well    Post vital signs: stable    Level of consciousness: awake and responds to stimulation    Nausea/Vomiting: no nausea/vomiting    Complications: none    Transfer of care protocol was followed      Last vitals:   Visit Vitals  /73 (BP Location: Left arm, Patient Position: Lying)   Pulse (!) 59   Temp 36.8 °C (98.2 °F) (Temporal)   Resp 16   Ht 5' 9" (1.753 m)   Wt 74.8 kg (165 lb)   SpO2 99%   BMI 24.37 kg/m²     "

## 2020-09-08 NOTE — [employer] PATIENT INSTRUCTIONS
Discharge Summary/Instructions after an Endoscopic Procedure  Patient Name: Daquan Jansen  Patient MRN: [id number]  Patient YOB: 1960 Tuesday, September 8, 2020  Eduin Allan MD  RESTRICTIONS:  During your procedure today, you received medications for sedation.  These   medications may affect your judgment, balance and coordination.  Therefore,   for 24 hours, you have the following restrictions:   - DO NOT drive a car, operate machinery, make legal/financial decisions,   sign important papers or drink alcohol.    ACTIVITY:  Today: no heavy lifting, straining or running due to procedural   sedation/anesthesia.  The following day: return to full activity including work.  DIET:  Eat and drink normally unless instructed otherwise.     TREATMENT FOR COMMON SIDE EFFECTS:  - Mild abdominal pain, nausea, belching, bloating or excessive gas:  rest,   eat lightly and use a heating pad.  - Sore Throat: treat with throat lozenges and/or gargle with warm salt   water.  - Because air was used during the procedure, expelling large amounts of air   from your rectum or belching is normal.  - If a bowel prep was taken, you may not have a bowel movement for 1-3 days.    This is normal.  SYMPTOMS TO WATCH FOR AND REPORT TO YOUR PHYSICIAN:  1. Abdominal pain or bloating, other than gas cramps.  2. Chest pain.  3. Back pain.  4. Signs of infection such as: chills or fever occurring within 24 hours   after the procedure.  5. Rectal bleeding, which would show as bright red, maroon, or black stools.   (A tablespoon of blood from the rectum is not serious, especially if   hemorrhoids are present.)  6. Vomiting.  7. Weakness or dizziness.  GO DIRECTLY TO THE NEAREST EMERGENCY ROOM IF YOU HAVE ANY OF THE FOLLOWING:      Difficulty breathing              Chills and/or fever over 101 F   Persistent vomiting and/or vomiting blood   Severe abdominal pain   Severe chest pain   Black, tarry stools   Bleeding- more than one  tablespoon   Any other symptom or condition that you feel may need urgent attention  Your doctor recommends these additional instructions:  If any biopsies were taken, your doctors clinic will contact you in 1 to 2   weeks with any results.  - Discharge patient to home.   - Resume previous diet today.   - Continue present medications.   - Await pathology results.   - Repeat colonoscopy in 1 year for surveillance.   - Return to GI clinic at appointment to be scheduled.   - Will obtain CTE to further assess inflammatory burden as well as to   reassess the anastomotic stricture. He will likely need surgical revision   prior to starting further therapy.  - Patient has a contact number available for emergencies.  The signs and   symptoms of potential delayed complications were discussed with the   patient.  Return to normal activities tomorrow.  Written discharge   instructions were provided to the patient.  For questions, problems or results please call your physician - Eduin Allan MD at Work:  [phone].  [facility], EMERGENCY ROOM PHONE NUMBER: [phone]  IF A COMPLICATION OR EMERGENCY SITUATION ARISES AND YOU ARE UNABLE TO REACH   YOUR PHYSICIAN - GO DIRECTLY TO THE EMERGENCY ROOM.  Eduin Allan MD  9/8/2020 11:06:40 AM  This report has been verified and signed electronically.  [employer]

## 2020-09-08 NOTE — ANESTHESIA POSTPROCEDURE EVALUATION
Anesthesia Post Evaluation    Patient: Daquan Jansen    Procedure(s) Performed: Procedure(s) (LRB):  COLONOSCOPY (N/A)    Final Anesthesia Type: general    Patient location during evaluation: PACU  Patient participation: Yes- Able to Participate  Level of consciousness: awake and alert and oriented  Post-procedure vital signs: reviewed and stable  Pain management: adequate  Airway patency: patent    PONV status at discharge: No PONV  Anesthetic complications: no      Cardiovascular status: blood pressure returned to baseline, hemodynamically stable and stable  Respiratory status: unassisted, room air and spontaneous ventilation  Hydration status: euvolemic  Follow-up not needed.          Vitals Value Taken Time   /83 09/08/20 1138   Temp 36.8 °C (98.2 °F) 09/08/20 1108   Pulse 55 09/08/20 1138   Resp 16 09/08/20 1138   SpO2 100 % 09/08/20 1138         Event Time   Out of Recovery 11:39:09         Pain/Donya Score: Donya Score: 9 (9/8/2020 11:08 AM)

## 2020-09-08 NOTE — DISCHARGE INSTRUCTIONS
Colonoscopy     A camera attached to a flexible tube with a viewing lens is used to take video pictures.     Colonoscopy is a test to view the inside of your lower digestive tract (colon and rectum). Sometimes it can show the last part of the small intestine (ileum). During the test, small pieces of tissue may be removed for testing. This is called a biopsy. Small growths, such as polyps, may also be removed.   Why is colonoscopy done?  The test is done to help look for colon cancer. And it can help find the source of abdominal pain, bleeding, and changes in bowel habits. It may be needed once a year, depending on factors such as your:  · Age  · Health history  · Family health history  · Symptoms  · Results from any prior colonoscopy  Risks and possible complications  These include:  · Bleeding               · A puncture or tear in the colon   · Risks of anesthesia  · A cancer lesion not being seen  Getting ready   To prepare for the test:  · Talk with your healthcare provider about the risks of the test (see below). Also ask your healthcare provider about alternatives to the test.  · Tell your healthcare provider about any medicines you take. Also tell him or her about any health conditions you may have.  · Make sure your rectum and colon are empty for the test. Follow the diet and bowel prep instructions exactly. If you dont, the test may need to be rescheduled.  · Plan for a friend or family member to drive you home after the test.     Colonoscopy provides an inside view of the entire colon.     You may discuss the results with your doctor right away or at a future visit.  During the test   The test is usually done in the hospital on an outpatient basis. This means you go home the same day. The procedure takes about 30 minutes. During that time:  · You are given relaxing (sedating) medicine through an IV line. You may be drowsy, or fully asleep.  · The healthcare provider will first give you a physical exam to  check for anal and rectal problems.  · Then the anus is lubricated and the scope inserted.  · If you are awake, you may have a feeling similar to needing to have a bowel movement. You may also feel pressure as air is pumped into the colon. Its OK to pass gas during the procedure.  · Biopsy, polyp removal, or other treatments may be done during the test.  After the test   You may have gas right after the test. It can help to try to pass it to help prevent later bloating. Your healthcare provider may discuss the results with you right away. Or you may need to schedule a follow-up visit to talk about the results. After the test, you can go back to your normal eating and other activities. You may be tired from the sedation and need to rest for a few hours.  Date Last Reviewed: 11/1/2016 © 2000-2017 [employer]. [address] [place] [address], [place] [address]. All rights reserved. This information is not intended as a substitute for professional medical care. Always follow your healthcare professional's instructions.

## 2020-09-10 ENCOUNTER — TELEPHONE (OUTPATIENT)
Dept: GASTROENTEROLOGY | Facility: HOSPITAL | Age: 60
End: 2020-09-10

## 2020-09-10 DIAGNOSIS — K50.00 CROHN'S DISEASE OF SMALL INTESTINE WITHOUT COMPLICATION: ICD-10-CM

## 2020-09-10 NOTE — TELEPHONE ENCOUNTER
I'm still waiting on his bxs to come back from his colonoscopy but I want to get the CT scan done for him.     Thanks.

## 2020-09-14 LAB
FINAL PATHOLOGIC DIAGNOSIS: NORMAL
GROSS: NORMAL

## 2020-09-23 ENCOUNTER — HOSPITAL ENCOUNTER (OUTPATIENT)
Dept: CARDIOLOGY | Facility: CLINIC | Age: 60
Discharge: HOME | End: 2020-09-23
Payer: MEDICARE

## 2020-09-23 ENCOUNTER — OFFICE VISIT (OUTPATIENT)
Dept: SURGERY | Facility: CLINIC | Age: 60
End: 2020-09-23
Payer: MEDICARE

## 2020-09-23 ENCOUNTER — HOSPITAL ENCOUNTER (OUTPATIENT)
Dept: RADIOLOGY | Facility: HOSPITAL | Age: 60
Discharge: HOME | End: 2020-09-23
Attending: INTERNAL MEDICINE
Payer: MEDICARE

## 2020-09-23 VITALS
SYSTOLIC BLOOD PRESSURE: 136 MMHG | BODY MASS INDEX: 23.67 KG/M2 | DIASTOLIC BLOOD PRESSURE: 76 MMHG | HEIGHT: 69 IN | WEIGHT: 159.81 LBS | HEART RATE: 86 BPM

## 2020-09-23 DIAGNOSIS — K56.699 STRICTURE OF SMALL INTESTINE: ICD-10-CM

## 2020-09-23 DIAGNOSIS — Z01.818 PRE-OP EVALUATION: ICD-10-CM

## 2020-09-23 DIAGNOSIS — Z01.818 PRE-OP EVALUATION: Primary | ICD-10-CM

## 2020-09-23 DIAGNOSIS — K50.00 CROHN'S DISEASE OF SMALL INTESTINE WITHOUT COMPLICATION: ICD-10-CM

## 2020-09-23 DIAGNOSIS — K50.019 CROHN'S DISEASE OF ILEUM WITH COMPLICATION: Primary | ICD-10-CM

## 2020-09-23 LAB
CREAT SERPL-MCNC: 0.8 MG/DL (ref 0.5–1.4)
SAMPLE: NORMAL

## 2020-09-23 PROCEDURE — 99214 PR OFFICE/OUTPT VISIT, EST, LEVL IV, 30-39 MIN: ICD-10-PCS | Mod: S$GLB,,, | Performed by: COLON & RECTAL SURGERY

## 2020-09-23 PROCEDURE — 93010 ELECTROCARDIOGRAM REPORT: CPT | Mod: S$GLB,,, | Performed by: INTERNAL MEDICINE

## 2020-09-23 PROCEDURE — 25500020 PHARM REV CODE 255: Performed by: INTERNAL MEDICINE

## 2020-09-23 PROCEDURE — 93005 EKG 12-LEAD: ICD-10-PCS | Mod: S$GLB,,, | Performed by: COLON & RECTAL SURGERY

## 2020-09-23 PROCEDURE — 3008F BODY MASS INDEX DOCD: CPT | Mod: CPTII,S$GLB,, | Performed by: COLON & RECTAL SURGERY

## 2020-09-23 PROCEDURE — 74177 CT ENTEROGRAPHY ABD_PELVIS WITH CONTRAST: ICD-10-PCS | Mod: 26,,, | Performed by: RADIOLOGY

## 2020-09-23 PROCEDURE — 74177 CT ABD & PELVIS W/CONTRAST: CPT | Mod: 26,,, | Performed by: RADIOLOGY

## 2020-09-23 PROCEDURE — 93005 ELECTROCARDIOGRAM TRACING: CPT | Mod: S$GLB,,, | Performed by: COLON & RECTAL SURGERY

## 2020-09-23 PROCEDURE — A9698 NON-RAD CONTRAST MATERIALNOC: HCPCS | Performed by: INTERNAL MEDICINE

## 2020-09-23 PROCEDURE — 74177 CT ABD & PELVIS W/CONTRAST: CPT | Mod: TC

## 2020-09-23 PROCEDURE — 93010 EKG 12-LEAD: ICD-10-PCS | Mod: S$GLB,,, | Performed by: INTERNAL MEDICINE

## 2020-09-23 PROCEDURE — 3008F PR BODY MASS INDEX (BMI) DOCUMENTED: ICD-10-PCS | Mod: CPTII,S$GLB,, | Performed by: COLON & RECTAL SURGERY

## 2020-09-23 PROCEDURE — 99999 PR PBB SHADOW E&M-EST. PATIENT-LVL IV: ICD-10-PCS | Mod: PBBFAC,,, | Performed by: COLON & RECTAL SURGERY

## 2020-09-23 PROCEDURE — 99214 OFFICE O/P EST MOD 30 MIN: CPT | Mod: S$GLB,,, | Performed by: COLON & RECTAL SURGERY

## 2020-09-23 PROCEDURE — 99999 PR PBB SHADOW E&M-EST. PATIENT-LVL IV: CPT | Mod: PBBFAC,,, | Performed by: COLON & RECTAL SURGERY

## 2020-09-23 RX ORDER — NEOMYCIN SULFATE 500 MG/1
TABLET ORAL
Qty: 6 TABLET | Refills: 0 | Status: ON HOLD | OUTPATIENT
Start: 2020-09-23 | End: 2020-10-07 | Stop reason: HOSPADM

## 2020-09-23 RX ORDER — METRONIDAZOLE 500 MG/1
TABLET ORAL
Qty: 3 TABLET | Refills: 0 | Status: ON HOLD | OUTPATIENT
Start: 2020-09-23 | End: 2020-10-07 | Stop reason: HOSPADM

## 2020-09-23 RX ORDER — ATORVASTATIN CALCIUM 40 MG/1
40 TABLET, FILM COATED ORAL DAILY
COMMUNITY
End: 2020-10-21

## 2020-09-23 RX ORDER — POLYETHYLENE GLYCOL 3350 17 G/17G
POWDER, FOR SOLUTION ORAL
Qty: 238 G | Refills: 0 | Status: ON HOLD | OUTPATIENT
Start: 2020-09-23 | End: 2020-10-07 | Stop reason: HOSPADM

## 2020-09-23 RX ORDER — METOPROLOL SUCCINATE 25 MG/1
25 TABLET, EXTENDED RELEASE ORAL DAILY
COMMUNITY

## 2020-09-23 RX ADMIN — BARIUM SULFATE 225 ML: 1 SUSPENSION ORAL at 08:09

## 2020-09-23 RX ADMIN — BARIUM SULFATE 450 ML: 1 SUSPENSION ORAL at 08:09

## 2020-09-23 RX ADMIN — IOHEXOL 125 ML: 350 INJECTION, SOLUTION INTRAVENOUS at 10:09

## 2020-09-23 RX ADMIN — BARIUM SULFATE 450 ML: 1 SUSPENSION ORAL at 07:09

## 2020-09-23 NOTE — H&P (VIEW-ONLY)
HPI:  Daquan Jansen is a 60 y.o. male with history of Crohn's ileitis who presents in follow-up after colonoscopy showed severe stenosis of his ileo-colonic anastomosis. CTE showed active inflammatory small bowel Crohn disease with luminal narrowing and stricture of the terminal ileum, and possible fistulous tract between the ileum and cecum. Since his last visit he reports continued RLQ abdominal pain when he eats that never really subsides. He also reports difficulty having bowel movements. He sometimes has a bowel movement once per day and sometimes once per week. No straining. No bleeding. Patient reports fatigue.      From previous visit:  He was diagnosed with Crohn's disease approximately 25 year ago.  His initial presentation was vomiting, weight loss, and abdominal pain.  He was initially treated with corticosteroids and then transitioned to Humira.  Currently, he is on Remicade every 8 weeks and azathioprine.  It is reported that he is prescribed to take 150 mg of azathioprine but he has only been taking 75 mg because he does not think the increased dose made a difference.     Currently, the patient describes symptoms of bloating and midline abdominal pain.  He is usually having 1 bowel movement per day, but intermittent diarrhea consisting of 3-4 semi-formed stools per day with mucous but no blood.  These symptoms have been happening for approximately the last 1 year.  About 1.5 years ago he reports he was admitted to an outside hospital with a flare with symptoms of nausea/vomiting and abdominal pain which was able to resolve with IV corticosteroids followed by a steroid taper.     The patient has not been on prescribed corticosteroids since that time over a year ago, but he does sometimes self take intermittent prednisone including a dose approximately 2 weeks ago that helped alleviate his symptoms.  The patient denies that he has had any recent fevers/chills.     Nutritionally, the patient relates  that he his on a self prescribed low fiber diet and has had weight loss of approximately 16 lbs over the last 1 year (185 lbs to 169 lbs)and he feels that he is losing muscle mass.     Functionally, he is on disability due to back and right hip pain for which he is on chronic opioids.  The patient describes that he can walk 2 city blocks without chest pain but would be unable to walk up a couple of flights of stairs secondary to his musculoskeletal pain.  He does report an episode approximately 9 months ago of chest pain and sweating after activity for which he never had evaluation.  The daughter and patient report he had a cardiac stress test in approximately 2006 which was normal.  He is a former smoker with a 33 pack year history.    Past Medical History:   Diagnosis Date    Arthritis - hip     Chronic pain     Chronic, continuous use of opioids     Colon polyp 1994    Doctor said had a few small ones nothing to worry about    Crohn's disease     Food intolerance 1995    Anything with seeds    Hyperlipemia     Restless leg syndrome     Spinal stenosis         Past Surgical History:   Procedure Laterality Date    APPENDECTOMY      Open, 29 years ago    COLONOSCOPY  Had a lot of them    None    COLONOSCOPY N/A 9/8/2020    Procedure: COLONOSCOPY;  Surgeon: Eduin Allan MD;  Location: [facility] ([address] [place] [address]);  Service: Endoscopy;  Laterality: N/A;  COVID test at [facility] on 9/5-GT    LUMBAR FUSION      UPPER GASTROINTESTINAL ENDOSCOPY  Dont remember    In hospital recordes       Review of patient's allergies indicates:  No Known Allergies    Family History   Problem Relation Age of Onset    Heart disease Mother     Bone cancer Father     Diabetes Brother        Social History     Socioeconomic History    Marital status:      Spouse name: Not on file    Number of children: Not on file    Years of education: Not on file    Highest education level: Not on file   Occupational History    Not  "on file   Social Needs    Financial resource strain: Not very hard    Food insecurity     Worry: Never true     Inability: Never true    Transportation needs     Medical: No     Non-medical: No   Tobacco Use    Smoking status: Former Smoker     Packs/day: 1.50     Years: 45.00     Pack years: 67.50     Types: Cigarettes, Cigars     Start date: 1975     Quit date: 2002     Years since quittin.0    Smokeless tobacco: Current User     Types: Chew    Tobacco comment: Still dip   Substance and Sexual Activity    Alcohol use: Never     Frequency: Never     Drinks per session: Patient refused     Binge frequency: Never    Drug use: Never    Sexual activity: Not Currently     Partners: Female     Birth control/protection: None     Comment: None   Lifestyle    Physical activity     Days per week: 5 days     Minutes per session: 150+ min    Stress: Only a little   Relationships    Social connections     Talks on phone: More than three times a week     Gets together: Once a week     Attends [employer] service: Not on file     Active member of club or organization: No     Attends meetings of clubs or organizations: Never     Relationship status:    Other Topics Concern    Not on file   Social History Narrative    Not on file       ROS:  GENERAL: No fever, chills, fatigability or weight loss.  Integument: No rashes, redness, icterus  CHEST: Denies GARCIA, cyanosis, wheezing, cough and sputum production.  CARDIOVASCULAR: Denies chest pain, PND, orthopnea or reduced exercise tolerance.  GI: Denies abd pain, dysphagia, nausea, vomiting, no hematemesis   : Denies burning on urination, no hematuria, no bacteriuria  MSK: No deformities, swelling, joint pain swelling  Neurologic: No HAs, seizures, weakness, paresthesias, gait problems    PE:  General appearance non-toxic, fatigued  /76 (BP Location: Left arm, Patient Position: Sitting, BP Method: Large (Automatic))   Pulse 86   Ht 5' 9" (1.753 " m)   Wt 72.5 kg (159 lb 12.8 oz)   BMI 23.60 kg/m²   Sclera/ Skin anicteric  LN nonpalpable  AT NC EOMI  Neck supple trachea midline   Chest symmetric, nl excursion, no retractions, breathing comfortably  Abdomen  ND soft NT.  no masses, no organomegaly. Previous healed RLQ scar  EXT - no CCE  Neuro:  Mood/ affect nl, alert and oriented x 3, moves all ext's, gait nl      Assessment:  1. Crohn's ileitis with symptomatic stricture - colonoscopy showed severe stenosis of his ileo-colonic anastomosis. CTE showed active inflammatory small bowel Crohn disease with luminal narrowing and stricture of the terminal ileum, and possible fistulous tract between the ileum and cecum.    Plan:  Schedule surgery - ileocolic resection on 10/2/2020  Labs- CBC, CMP, CRP, Pre-Albumin  Boost or Ensure BID

## 2020-09-23 NOTE — PROGRESS NOTES
HPI:  Daquan Jansen is a 60 y.o. male with history of Crohn's ileitis who presents in follow-up after colonoscopy showed severe stenosis of his ileo-colonic anastomosis. CTE showed active inflammatory small bowel Crohn disease with luminal narrowing and stricture of the terminal ileum, and possible fistulous tract between the ileum and cecum. Since his last visit he reports continued RLQ abdominal pain when he eats that never really subsides. He also reports difficulty having bowel movements. He sometimes has a bowel movement once per day and sometimes once per week. No straining. No bleeding. Patient reports fatigue.      From previous visit:  He was diagnosed with Crohn's disease approximately 25 year ago.  His initial presentation was vomiting, weight loss, and abdominal pain.  He was initially treated with corticosteroids and then transitioned to Humira.  Currently, he is on Remicade every 8 weeks and azathioprine.  It is reported that he is prescribed to take 150 mg of azathioprine but he has only been taking 75 mg because he does not think the increased dose made a difference.     Currently, the patient describes symptoms of bloating and midline abdominal pain.  He is usually having 1 bowel movement per day, but intermittent diarrhea consisting of 3-4 semi-formed stools per day with mucous but no blood.  These symptoms have been happening for approximately the last 1 year.  About 1.5 years ago he reports he was admitted to an outside hospital with a flare with symptoms of nausea/vomiting and abdominal pain which was able to resolve with IV corticosteroids followed by a steroid taper.     The patient has not been on prescribed corticosteroids since that time over a year ago, but he does sometimes self take intermittent prednisone including a dose approximately 2 weeks ago that helped alleviate his symptoms.  The patient denies that he has had any recent fevers/chills.     Nutritionally, the patient relates  that he his on a self prescribed low fiber diet and has had weight loss of approximately 16 lbs over the last 1 year (185 lbs to 169 lbs)and he feels that he is losing muscle mass.     Functionally, he is on disability due to back and right hip pain for which he is on chronic opioids.  The patient describes that he can walk 2 city blocks without chest pain but would be unable to walk up a couple of flights of stairs secondary to his musculoskeletal pain.  He does report an episode approximately 9 months ago of chest pain and sweating after activity for which he never had evaluation.  The daughter and patient report he had a cardiac stress test in approximately 2006 which was normal.  He is a former smoker with a 33 pack year history.    Past Medical History:   Diagnosis Date    Arthritis - hip     Chronic pain     Chronic, continuous use of opioids     Colon polyp 1994    Doctor said had a few small ones nothing to worry about    Crohn's disease     Food intolerance 1995    Anything with seeds    Hyperlipemia     Restless leg syndrome     Spinal stenosis         Past Surgical History:   Procedure Laterality Date    APPENDECTOMY      Open, 29 years ago    COLONOSCOPY  Had a lot of them    None    COLONOSCOPY N/A 9/8/2020    Procedure: COLONOSCOPY;  Surgeon: Ediun Allan MD;  Location: [facility] ([address] [place] [address]);  Service: Endoscopy;  Laterality: N/A;  COVID test at [facility] on 9/5-GT    LUMBAR FUSION      UPPER GASTROINTESTINAL ENDOSCOPY  Dont remember    In hospital recordes       Review of patient's allergies indicates:  No Known Allergies    Family History   Problem Relation Age of Onset    Heart disease Mother     Bone cancer Father     Diabetes Brother        Social History     Socioeconomic History    Marital status:      Spouse name: Not on file    Number of children: Not on file    Years of education: Not on file    Highest education level: Not on file   Occupational History    Not  "on file   Social Needs    Financial resource strain: Not very hard    Food insecurity     Worry: Never true     Inability: Never true    Transportation needs     Medical: No     Non-medical: No   Tobacco Use    Smoking status: Former Smoker     Packs/day: 1.50     Years: 45.00     Pack years: 67.50     Types: Cigarettes, Cigars     Start date: 1975     Quit date: 2002     Years since quittin.0    Smokeless tobacco: Current User     Types: Chew    Tobacco comment: Still dip   Substance and Sexual Activity    Alcohol use: Never     Frequency: Never     Drinks per session: Patient refused     Binge frequency: Never    Drug use: Never    Sexual activity: Not Currently     Partners: Female     Birth control/protection: None     Comment: None   Lifestyle    Physical activity     Days per week: 5 days     Minutes per session: 150+ min    Stress: Only a little   Relationships    Social connections     Talks on phone: More than three times a week     Gets together: Once a week     Attends [employer] service: Not on file     Active member of club or organization: No     Attends meetings of clubs or organizations: Never     Relationship status:    Other Topics Concern    Not on file   Social History Narrative    Not on file       ROS:  GENERAL: No fever, chills, fatigability or weight loss.  Integument: No rashes, redness, icterus  CHEST: Denies GARCIA, cyanosis, wheezing, cough and sputum production.  CARDIOVASCULAR: Denies chest pain, PND, orthopnea or reduced exercise tolerance.  GI: Denies abd pain, dysphagia, nausea, vomiting, no hematemesis   : Denies burning on urination, no hematuria, no bacteriuria  MSK: No deformities, swelling, joint pain swelling  Neurologic: No HAs, seizures, weakness, paresthesias, gait problems    PE:  General appearance non-toxic, fatigued  /76 (BP Location: Left arm, Patient Position: Sitting, BP Method: Large (Automatic))   Pulse 86   Ht 5' 9" (1.753 " m)   Wt 72.5 kg (159 lb 12.8 oz)   BMI 23.60 kg/m²   Sclera/ Skin anicteric  LN nonpalpable  AT NC EOMI  Neck supple trachea midline   Chest symmetric, nl excursion, no retractions, breathing comfortably  Abdomen  ND soft NT.  no masses, no organomegaly. Previous healed RLQ scar  EXT - no CCE  Neuro:  Mood/ affect nl, alert and oriented x 3, moves all ext's, gait nl      Assessment:  1. Crohn's ileitis with symptomatic stricture - colonoscopy showed severe stenosis of his ileo-colonic anastomosis. CTE showed active inflammatory small bowel Crohn disease with luminal narrowing and stricture of the terminal ileum, and possible fistulous tract between the ileum and cecum.    Plan:  Schedule surgery - ileocolic resection on 10/2/2020  Labs- CBC, CMP, CRP, Pre-Albumin  Boost or Ensure BID

## 2020-09-25 ENCOUNTER — PATIENT MESSAGE (OUTPATIENT)
Dept: GASTROENTEROLOGY | Facility: HOSPITAL | Age: 60
End: 2020-09-25

## 2020-09-29 ENCOUNTER — TELEPHONE (OUTPATIENT)
Dept: SURGERY | Facility: CLINIC | Age: 60
End: 2020-09-29

## 2020-09-29 NOTE — TELEPHONE ENCOUNTER
----- Message from Juli De La Cruz MA sent at 9/29/2020  2:18 PM CDT -----  Contact: [phone]  pt has a few things to go over about his surgery on 10/2/2020      [phone]

## 2020-10-01 ENCOUNTER — TELEPHONE (OUTPATIENT)
Dept: SURGERY | Facility: CLINIC | Age: 60
End: 2020-10-01

## 2020-10-01 NOTE — TELEPHONE ENCOUNTER
Called pt to let him know he can only have one person with him. I did tell him that the other day when I gave instructions. He said his wife will not remember anything the doctor tells her. Told him he will be in the hospital a couple of days and Dr Arreola will be in to talk to him daily.

## 2020-10-01 NOTE — TELEPHONE ENCOUNTER
----- Message from Juli De La Cruz MA sent at 10/1/2020  2:44 PM CDT -----  Contact: [phone]  Pt Is having surgery tomorrow , he said he was told that his wife and daughter could wait for him in the family waiting room but he said he got a call from someone else saying her can only have 1 person. He needs his daughter to help his wife due to bipolar, she gets lost easily and does not retain information given to her.

## 2020-10-02 ENCOUNTER — HOSPITAL ENCOUNTER (INPATIENT)
Facility: HOSPITAL | Age: 60
LOS: 5 days | Discharge: HOME | DRG: 331 | End: 2020-10-07
Attending: COLON & RECTAL SURGERY | Admitting: COLON & RECTAL SURGERY
Payer: MEDICARE

## 2020-10-02 ENCOUNTER — ANESTHESIA EVENT (OUTPATIENT)
Dept: SURGERY | Facility: HOSPITAL | Age: 60
DRG: 331 | End: 2020-10-02
Payer: MEDICARE

## 2020-10-02 ENCOUNTER — ANESTHESIA (OUTPATIENT)
Dept: SURGERY | Facility: HOSPITAL | Age: 60
DRG: 331 | End: 2020-10-02
Payer: MEDICARE

## 2020-10-02 DIAGNOSIS — K56.699 STRICTURE OF SMALL INTESTINE: ICD-10-CM

## 2020-10-02 DIAGNOSIS — K56.699 COLONIC STRICTURE: Primary | ICD-10-CM

## 2020-10-02 DIAGNOSIS — K50.012 CROHN'S DISEASE OF SMALL INTESTINE WITH INTESTINAL OBSTRUCTION: ICD-10-CM

## 2020-10-02 LAB
ABO + RH BLD: NORMAL
BLD GP AB SCN CELLS X3 SERPL QL: NORMAL
SARS-COV-2 RDRP RESP QL NAA+PROBE: NEGATIVE

## 2020-10-02 PROCEDURE — 37000009 HC ANESTHESIA EA ADD 15 MINS: Performed by: COLON & RECTAL SURGERY

## 2020-10-02 PROCEDURE — 63600175 PHARM REV CODE 636 W HCPCS: Performed by: STUDENT IN AN ORGANIZED HEALTH CARE EDUCATION/TRAINING PROGRAM

## 2020-10-02 PROCEDURE — D9220A PRA ANESTHESIA: ICD-10-PCS | Mod: ,,, | Performed by: ANESTHESIOLOGY

## 2020-10-02 PROCEDURE — 88307 TISSUE EXAM BY PATHOLOGIST: CPT | Performed by: PATHOLOGY

## 2020-10-02 PROCEDURE — 37000008 HC ANESTHESIA 1ST 15 MINUTES: Performed by: COLON & RECTAL SURGERY

## 2020-10-02 PROCEDURE — 44205 PR LAP,SURG,COLECTOMY,W/REMVL TERM ILEUM: ICD-10-PCS | Mod: ,,, | Performed by: COLON & RECTAL SURGERY

## 2020-10-02 PROCEDURE — 36000710: Performed by: COLON & RECTAL SURGERY

## 2020-10-02 PROCEDURE — 49905 PR OMENTAL FLAP,INTRA-ABDOMINAL: ICD-10-PCS | Mod: ,,, | Performed by: COLON & RECTAL SURGERY

## 2020-10-02 PROCEDURE — 25000003 PHARM REV CODE 250: Performed by: NURSE PRACTITIONER

## 2020-10-02 PROCEDURE — 20600001 HC STEP DOWN PRIVATE ROOM

## 2020-10-02 PROCEDURE — 25000003 PHARM REV CODE 250: Performed by: STUDENT IN AN ORGANIZED HEALTH CARE EDUCATION/TRAINING PROGRAM

## 2020-10-02 PROCEDURE — 36000711: Performed by: COLON & RECTAL SURGERY

## 2020-10-02 PROCEDURE — U0002 COVID-19 LAB TEST NON-CDC: HCPCS

## 2020-10-02 PROCEDURE — 63600175 PHARM REV CODE 636 W HCPCS: Performed by: ANESTHESIOLOGY

## 2020-10-02 PROCEDURE — 94761 N-INVAS EAR/PLS OXIMETRY MLT: CPT

## 2020-10-02 PROCEDURE — S0030 INJECTION, METRONIDAZOLE: HCPCS | Performed by: NURSE PRACTITIONER

## 2020-10-02 PROCEDURE — 63600175 PHARM REV CODE 636 W HCPCS: Performed by: NURSE PRACTITIONER

## 2020-10-02 PROCEDURE — 49905 OMENTAL FLAP INTRA-ABDOM: CPT | Mod: ,,, | Performed by: COLON & RECTAL SURGERY

## 2020-10-02 PROCEDURE — 44205 LAP COLECTOMY PART W/ILEUM: CPT | Mod: ,,, | Performed by: COLON & RECTAL SURGERY

## 2020-10-02 PROCEDURE — 25000003 PHARM REV CODE 250: Performed by: COLON & RECTAL SURGERY

## 2020-10-02 PROCEDURE — 27201423 OPTIME MED/SURG SUP & DEVICES STERILE SUPPLY: Performed by: COLON & RECTAL SURGERY

## 2020-10-02 PROCEDURE — 71000033 HC RECOVERY, INTIAL HOUR: Performed by: COLON & RECTAL SURGERY

## 2020-10-02 PROCEDURE — C1765 ADHESION BARRIER: HCPCS | Performed by: COLON & RECTAL SURGERY

## 2020-10-02 PROCEDURE — D9220A PRA ANESTHESIA: Mod: ,,, | Performed by: ANESTHESIOLOGY

## 2020-10-02 PROCEDURE — 71000016 HC POSTOP RECOV ADDL HR: Performed by: COLON & RECTAL SURGERY

## 2020-10-02 PROCEDURE — 86850 RBC ANTIBODY SCREEN: CPT

## 2020-10-02 PROCEDURE — 88307 PR  SURG PATH,LEVEL V: ICD-10-PCS | Mod: 26,,, | Performed by: PATHOLOGY

## 2020-10-02 PROCEDURE — 71000015 HC POSTOP RECOV 1ST HR: Performed by: COLON & RECTAL SURGERY

## 2020-10-02 PROCEDURE — 88307 TISSUE EXAM BY PATHOLOGIST: CPT | Mod: 26,,, | Performed by: PATHOLOGY

## 2020-10-02 DEVICE — BARRIER SEPRAFILM ADHESION: Type: IMPLANTABLE DEVICE | Site: ABDOMEN | Status: FUNCTIONAL

## 2020-10-02 RX ORDER — ROCURONIUM BROMIDE 10 MG/ML
INJECTION, SOLUTION INTRAVENOUS
Status: DISCONTINUED | OUTPATIENT
Start: 2020-10-02 | End: 2020-10-02

## 2020-10-02 RX ORDER — PHENYLEPHRINE HYDROCHLORIDE 10 MG/ML
INJECTION INTRAVENOUS
Status: DISCONTINUED | OUTPATIENT
Start: 2020-10-02 | End: 2020-10-02

## 2020-10-02 RX ORDER — ACETAMINOPHEN 650 MG/20.3ML
975 LIQUID ORAL
Status: COMPLETED | OUTPATIENT
Start: 2020-10-02 | End: 2020-10-02

## 2020-10-02 RX ORDER — MUPIROCIN 20 MG/G
OINTMENT TOPICAL 2 TIMES DAILY
Status: COMPLETED | OUTPATIENT
Start: 2020-10-02 | End: 2020-10-07

## 2020-10-02 RX ORDER — ONDANSETRON 2 MG/ML
4 INJECTION INTRAMUSCULAR; INTRAVENOUS DAILY PRN
Status: DISCONTINUED | OUTPATIENT
Start: 2020-10-02 | End: 2020-10-02 | Stop reason: HOSPADM

## 2020-10-02 RX ORDER — METRONIDAZOLE 500 MG/100ML
500 INJECTION, SOLUTION INTRAVENOUS
Status: COMPLETED | OUTPATIENT
Start: 2020-10-02 | End: 2020-10-02

## 2020-10-02 RX ORDER — IBUPROFEN 200 MG
800 TABLET ORAL EVERY 8 HOURS
Status: DISCONTINUED | OUTPATIENT
Start: 2020-10-03 | End: 2020-10-02

## 2020-10-02 RX ORDER — TRIPROLIDINE/PSEUDOEPHEDRINE 2.5MG-60MG
600 TABLET ORAL
Status: COMPLETED | OUTPATIENT
Start: 2020-10-02 | End: 2020-10-02

## 2020-10-02 RX ORDER — OXYCODONE HYDROCHLORIDE 5 MG/1
5 TABLET ORAL EVERY 4 HOURS PRN
Status: DISCONTINUED | OUTPATIENT
Start: 2020-10-02 | End: 2020-10-07 | Stop reason: HOSPADM

## 2020-10-02 RX ORDER — OXYCODONE HYDROCHLORIDE 10 MG/1
10 TABLET ORAL EVERY 4 HOURS PRN
Status: DISCONTINUED | OUTPATIENT
Start: 2020-10-02 | End: 2020-10-07 | Stop reason: HOSPADM

## 2020-10-02 RX ORDER — FENTANYL CITRATE 50 UG/ML
INJECTION, SOLUTION INTRAMUSCULAR; INTRAVENOUS
Status: DISCONTINUED | OUTPATIENT
Start: 2020-10-02 | End: 2020-10-02

## 2020-10-02 RX ORDER — HYDROMORPHONE HYDROCHLORIDE 1 MG/ML
INJECTION, SOLUTION INTRAMUSCULAR; INTRAVENOUS; SUBCUTANEOUS
Status: DISPENSED
Start: 2020-10-02 | End: 2020-10-03

## 2020-10-02 RX ORDER — ACETAMINOPHEN 10 MG/ML
1000 INJECTION, SOLUTION INTRAVENOUS EVERY 8 HOURS
Status: COMPLETED | OUTPATIENT
Start: 2020-10-02 | End: 2020-10-03

## 2020-10-02 RX ORDER — ONDANSETRON 2 MG/ML
4 INJECTION INTRAMUSCULAR; INTRAVENOUS EVERY 12 HOURS PRN
Status: DISCONTINUED | OUTPATIENT
Start: 2020-10-02 | End: 2020-10-03

## 2020-10-02 RX ORDER — SODIUM CHLORIDE 9 MG/ML
INJECTION, SOLUTION INTRAVENOUS CONTINUOUS PRN
Status: DISCONTINUED | OUTPATIENT
Start: 2020-10-02 | End: 2020-10-02

## 2020-10-02 RX ORDER — FENTANYL CITRATE 50 UG/ML
25 INJECTION, SOLUTION INTRAMUSCULAR; INTRAVENOUS EVERY 5 MIN PRN
Status: COMPLETED | OUTPATIENT
Start: 2020-10-02 | End: 2020-10-02

## 2020-10-02 RX ORDER — GABAPENTIN 300 MG/1
300 CAPSULE ORAL 3 TIMES DAILY
Status: DISCONTINUED | OUTPATIENT
Start: 2020-10-02 | End: 2020-10-07 | Stop reason: HOSPADM

## 2020-10-02 RX ORDER — SODIUM CHLORIDE 0.9 % (FLUSH) 0.9 %
10 SYRINGE (ML) INJECTION
Status: DISCONTINUED | OUTPATIENT
Start: 2020-10-02 | End: 2020-10-07 | Stop reason: HOSPADM

## 2020-10-02 RX ORDER — HYDROMORPHONE HYDROCHLORIDE 1 MG/ML
0.2 INJECTION, SOLUTION INTRAMUSCULAR; INTRAVENOUS; SUBCUTANEOUS EVERY 5 MIN PRN
Status: COMPLETED | OUTPATIENT
Start: 2020-10-02 | End: 2020-10-02

## 2020-10-02 RX ORDER — LIDOCAINE HYDROCHLORIDE AND EPINEPHRINE 10; 10 MG/ML; UG/ML
INJECTION, SOLUTION INFILTRATION; PERINEURAL
Status: DISCONTINUED | OUTPATIENT
Start: 2020-10-02 | End: 2020-10-02 | Stop reason: HOSPADM

## 2020-10-02 RX ORDER — EPHEDRINE SULFATE 50 MG/ML
INJECTION, SOLUTION INTRAVENOUS
Status: DISCONTINUED | OUTPATIENT
Start: 2020-10-02 | End: 2020-10-02

## 2020-10-02 RX ORDER — ALVIMOPAN 12 MG/1
12 CAPSULE ORAL ONCE
Status: COMPLETED | OUTPATIENT
Start: 2020-10-02 | End: 2020-10-02

## 2020-10-02 RX ORDER — SODIUM CHLORIDE 9 MG/ML
INJECTION, SOLUTION INTRAVENOUS CONTINUOUS
Status: DISCONTINUED | OUTPATIENT
Start: 2020-10-02 | End: 2020-10-02

## 2020-10-02 RX ORDER — PROPOFOL 10 MG/ML
VIAL (ML) INTRAVENOUS
Status: DISCONTINUED | OUTPATIENT
Start: 2020-10-02 | End: 2020-10-02

## 2020-10-02 RX ORDER — DEXAMETHASONE SODIUM PHOSPHATE 4 MG/ML
INJECTION, SOLUTION INTRA-ARTICULAR; INTRALESIONAL; INTRAMUSCULAR; INTRAVENOUS; SOFT TISSUE
Status: DISCONTINUED | OUTPATIENT
Start: 2020-10-02 | End: 2020-10-02

## 2020-10-02 RX ORDER — ACETAMINOPHEN 500 MG
1000 TABLET ORAL ONCE
Status: COMPLETED | OUTPATIENT
Start: 2020-10-02 | End: 2020-10-02

## 2020-10-02 RX ORDER — MIDAZOLAM HYDROCHLORIDE 1 MG/ML
INJECTION, SOLUTION INTRAMUSCULAR; INTRAVENOUS
Status: DISCONTINUED | OUTPATIENT
Start: 2020-10-02 | End: 2020-10-02

## 2020-10-02 RX ORDER — METOPROLOL SUCCINATE 25 MG/1
25 TABLET, EXTENDED RELEASE ORAL DAILY
Status: DISCONTINUED | OUTPATIENT
Start: 2020-10-02 | End: 2020-10-07 | Stop reason: HOSPADM

## 2020-10-02 RX ORDER — HEPARIN SODIUM 5000 [USP'U]/ML
5000 INJECTION, SOLUTION INTRAVENOUS; SUBCUTANEOUS EVERY 8 HOURS
Status: DISCONTINUED | OUTPATIENT
Start: 2020-10-02 | End: 2020-10-02

## 2020-10-02 RX ORDER — HYDROMORPHONE HYDROCHLORIDE 2 MG/ML
INJECTION, SOLUTION INTRAMUSCULAR; INTRAVENOUS; SUBCUTANEOUS
Status: DISCONTINUED | OUTPATIENT
Start: 2020-10-02 | End: 2020-10-02

## 2020-10-02 RX ORDER — MUPIROCIN 20 MG/G
1 OINTMENT TOPICAL
Status: COMPLETED | OUTPATIENT
Start: 2020-10-02 | End: 2020-10-02

## 2020-10-02 RX ORDER — ACETAMINOPHEN 500 MG
1000 TABLET ORAL EVERY 8 HOURS
Status: DISCONTINUED | OUTPATIENT
Start: 2020-10-03 | End: 2020-10-07 | Stop reason: HOSPADM

## 2020-10-02 RX ORDER — LIDOCAINE HYDROCHLORIDE 10 MG/ML
1 INJECTION, SOLUTION EPIDURAL; INFILTRATION; INTRACAUDAL; PERINEURAL
Status: COMPLETED | OUTPATIENT
Start: 2020-10-02 | End: 2020-10-02

## 2020-10-02 RX ORDER — LIDOCAINE HCL/PF 100 MG/5ML
SYRINGE (ML) INTRAVENOUS
Status: DISCONTINUED | OUTPATIENT
Start: 2020-10-02 | End: 2020-10-02

## 2020-10-02 RX ORDER — BUPIVACAINE HYDROCHLORIDE AND EPINEPHRINE 2.5; 5 MG/ML; UG/ML
INJECTION, SOLUTION EPIDURAL; INFILTRATION; INTRACAUDAL; PERINEURAL
Status: DISCONTINUED | OUTPATIENT
Start: 2020-10-02 | End: 2020-10-02 | Stop reason: HOSPADM

## 2020-10-02 RX ORDER — GABAPENTIN 300 MG/1
300 CAPSULE ORAL 3 TIMES DAILY
Status: DISCONTINUED | OUTPATIENT
Start: 2020-10-02 | End: 2020-10-02

## 2020-10-02 RX ORDER — GABAPENTIN 300 MG/1
300 CAPSULE ORAL
Status: COMPLETED | OUTPATIENT
Start: 2020-10-02 | End: 2020-10-02

## 2020-10-02 RX ORDER — TRAMADOL HYDROCHLORIDE 50 MG/1
50 TABLET ORAL EVERY 6 HOURS PRN
Status: DISCONTINUED | OUTPATIENT
Start: 2020-10-02 | End: 2020-10-07 | Stop reason: HOSPADM

## 2020-10-02 RX ORDER — SODIUM CHLORIDE 9 MG/ML
INJECTION, SOLUTION INTRAVENOUS
Status: COMPLETED | OUTPATIENT
Start: 2020-10-02 | End: 2020-10-02

## 2020-10-02 RX ADMIN — MIDAZOLAM HYDROCHLORIDE 2 MG: 1 INJECTION, SOLUTION INTRAMUSCULAR; INTRAVENOUS at 09:10

## 2020-10-02 RX ADMIN — HYDROMORPHONE HYDROCHLORIDE 0.2 MG: 1 INJECTION, SOLUTION INTRAMUSCULAR; INTRAVENOUS; SUBCUTANEOUS at 04:10

## 2020-10-02 RX ADMIN — EPHEDRINE SULFATE 10 MG: 50 INJECTION INTRAVENOUS at 10:10

## 2020-10-02 RX ADMIN — PHENYLEPHRINE HYDROCHLORIDE 100 MCG: 10 INJECTION INTRAVENOUS at 09:10

## 2020-10-02 RX ADMIN — PROPOFOL 170 MG: 10 INJECTION, EMULSION INTRAVENOUS at 09:10

## 2020-10-02 RX ADMIN — ROCURONIUM BROMIDE 50 MG: 10 INJECTION, SOLUTION INTRAVENOUS at 09:10

## 2020-10-02 RX ADMIN — FENTANYL CITRATE 25 MCG: 50 INJECTION INTRAMUSCULAR; INTRAVENOUS at 02:10

## 2020-10-02 RX ADMIN — OXYCODONE HYDROCHLORIDE 10 MG: 10 TABLET ORAL at 06:10

## 2020-10-02 RX ADMIN — DEXAMETHASONE SODIUM PHOSPHATE 4 MG: 4 INJECTION, SOLUTION INTRAMUSCULAR; INTRAVENOUS at 09:10

## 2020-10-02 RX ADMIN — ROCURONIUM BROMIDE 50 MG: 10 INJECTION, SOLUTION INTRAVENOUS at 11:10

## 2020-10-02 RX ADMIN — GABAPENTIN 300 MG: 300 CAPSULE ORAL at 02:10

## 2020-10-02 RX ADMIN — FENTANYL CITRATE 25 MCG: 50 INJECTION INTRAMUSCULAR; INTRAVENOUS at 03:10

## 2020-10-02 RX ADMIN — EPHEDRINE SULFATE 5 MG: 50 INJECTION INTRAVENOUS at 10:10

## 2020-10-02 RX ADMIN — METOPROLOL SUCCINATE 25 MG: 25 TABLET, EXTENDED RELEASE ORAL at 02:10

## 2020-10-02 RX ADMIN — SODIUM CHLORIDE: 0.9 INJECTION, SOLUTION INTRAVENOUS at 02:10

## 2020-10-02 RX ADMIN — SUGAMMADEX 400 MG: 100 INJECTION, SOLUTION INTRAVENOUS at 01:10

## 2020-10-02 RX ADMIN — METRONIDAZOLE 500 MG: 500 SOLUTION INTRAVENOUS at 09:10

## 2020-10-02 RX ADMIN — LIDOCAINE HYDROCHLORIDE 0.3 MG: 10 INJECTION, SOLUTION EPIDURAL; INFILTRATION; INTRACAUDAL at 08:10

## 2020-10-02 RX ADMIN — HYDROMORPHONE HYDROCHLORIDE 0.4 MG: 2 INJECTION, SOLUTION INTRAMUSCULAR; INTRAVENOUS; SUBCUTANEOUS at 01:10

## 2020-10-02 RX ADMIN — OXYCODONE HYDROCHLORIDE 10 MG: 10 TABLET ORAL at 02:10

## 2020-10-02 RX ADMIN — FENTANYL CITRATE 100 MCG: 50 INJECTION, SOLUTION INTRAMUSCULAR; INTRAVENOUS at 09:10

## 2020-10-02 RX ADMIN — ALVIMOPAN 12 MG: 12 CAPSULE ORAL at 08:10

## 2020-10-02 RX ADMIN — MUPIROCIN: 20 OINTMENT TOPICAL at 09:10

## 2020-10-02 RX ADMIN — SODIUM CHLORIDE: 0.9 INJECTION, SOLUTION INTRAVENOUS at 09:10

## 2020-10-02 RX ADMIN — SODIUM CHLORIDE, SODIUM GLUCONATE, SODIUM ACETATE, POTASSIUM CHLORIDE, MAGNESIUM CHLORIDE, SODIUM PHOSPHATE, DIBASIC, AND POTASSIUM PHOSPHATE: .53; .5; .37; .037; .03; .012; .00082 INJECTION, SOLUTION INTRAVENOUS at 10:10

## 2020-10-02 RX ADMIN — LIDOCAINE HYDROCHLORIDE 100 MG: 20 INJECTION, SOLUTION INTRAVENOUS at 09:10

## 2020-10-02 RX ADMIN — OXYCODONE HYDROCHLORIDE 10 MG: 10 TABLET ORAL at 10:10

## 2020-10-02 RX ADMIN — ACETAMINOPHEN 1000 MG: 10 INJECTION, SOLUTION INTRAVENOUS at 09:10

## 2020-10-02 RX ADMIN — IBUPROFEN 600 MG: 100 SUSPENSION ORAL at 08:10

## 2020-10-02 RX ADMIN — HYDROMORPHONE HYDROCHLORIDE 0.2 MG: 1 INJECTION, SOLUTION INTRAMUSCULAR; INTRAVENOUS; SUBCUTANEOUS at 03:10

## 2020-10-02 RX ADMIN — ACETAMINOPHEN 976.6 MG: 160 SOLUTION ORAL at 08:10

## 2020-10-02 RX ADMIN — GABAPENTIN 300 MG: 300 CAPSULE ORAL at 08:10

## 2020-10-02 RX ADMIN — MUPIROCIN 1 G: 20 OINTMENT TOPICAL at 08:10

## 2020-10-02 RX ADMIN — CEFTRIAXONE SODIUM 2 G: 2 INJECTION, SOLUTION INTRAVENOUS at 09:10

## 2020-10-02 RX ADMIN — ACETAMINOPHEN 1000 MG: 500 TABLET ORAL at 05:10

## 2020-10-02 RX ADMIN — SODIUM CHLORIDE: 0.9 INJECTION, SOLUTION INTRAVENOUS at 08:10

## 2020-10-02 RX ADMIN — HYDROMORPHONE HYDROCHLORIDE 0.6 MG: 2 INJECTION, SOLUTION INTRAMUSCULAR; INTRAVENOUS; SUBCUTANEOUS at 01:10

## 2020-10-02 RX ADMIN — ONDANSETRON 4 MG: 2 INJECTION INTRAMUSCULAR; INTRAVENOUS at 05:10

## 2020-10-02 RX ADMIN — PHENYLEPHRINE HYDROCHLORIDE 100 MCG: 10 INJECTION INTRAVENOUS at 10:10

## 2020-10-02 RX ADMIN — PHENYLEPHRINE HYDROCHLORIDE 200 MCG: 10 INJECTION INTRAVENOUS at 09:10

## 2020-10-02 RX ADMIN — ONDANSETRON 4 MG: 2 INJECTION INTRAMUSCULAR; INTRAVENOUS at 09:10

## 2020-10-02 NOTE — TRANSFER OF CARE
"Anesthesia Transfer of Care Note    Patient: Daquan Jansen    Procedure(s) Performed: Procedure(s) (LRB):  LAPAROSCOPIC ILEOCOLIC RESECTION (N/A)    Patient location: PACU    Anesthesia Type: general    Transport from OR: Transported from OR on 6-10 L/min O2 by face mask with adequate spontaneous ventilation    Post pain: adequate analgesia    Post assessment: no apparent anesthetic complications    Post vital signs: stable    Level of consciousness: awake    Nausea/Vomiting: no nausea/vomiting    Complications: none    Transfer of care protocol was followed      Last vitals:   Visit Vitals  BP (!) 159/84   Pulse 69   Temp 36.4 °C (97.5 °F) (Temporal)   Resp 15   Ht 5' 9" (1.753 m)   Wt 72.1 kg (159 lb)   SpO2 100%   BMI 23.48 kg/m²     "

## 2020-10-02 NOTE — PROGRESS NOTES
Dr. Moran to bedside to assess pt, aware of pt's pain and the meds given. New order to give 1 gram Tylenol.

## 2020-10-02 NOTE — INTERVAL H&P NOTE
The patient has been examined and the H&P has been reviewed:    I concur with the findings and no changes have occurred since H&P was written.    Surgery risks, benefits and alternative options discussed and understood by patient/family.          Active Hospital Problems    Diagnosis  POA    Colonic stricture [K56.699]  Yes      Resolved Hospital Problems   No resolved problems to display.

## 2020-10-02 NOTE — NURSING TRANSFER
Nursing Transfer Note      10/2/2020     Transfer To: 1054    Transfer via bed    Transfer with IV pole, pt bag of clothes    Transported by PCTs    Medicines sent: none    Chart send with patient: Yes    Notified: spouse    Patient reassessed at: 1820

## 2020-10-02 NOTE — BRIEF OP NOTE
[facility]  Brief Operative Note    SUMMARY     Surgery Date: 10/2/2020     Surgeon(s) and Role:     * DIAN Arreola MD - Primary     * Yahir Moran MD - Fellow     * Glenn Perdue MD - Fellow    Assisting Surgeon: None    Pre-op Diagnosis:  Crohn's disease of ileum with complication [K50.019]    Post-op Diagnosis:  Post-Op Diagnosis Codes:     * Crohn's disease of ileum with complication [K50.019]    Procedure(s) (LRB):  LAPAROSCOPIC ILEOCOLIC RESECTION (N/A)    Anesthesia: General    Description of Procedure: Laparoscopic lysis of adhesions, ileocolic resection, Kono-S antimesenteric side to side functional end to end anastomosis    Description of the findings of the procedure: ileocolic anastomotic stricture    Estimated Blood Loss: * No values recorded between 10/2/2020  9:52 AM and 10/2/2020  2:08 PM *    Estimated Blood Loss has been documented.         Specimens:   Specimen (12h ago, onward)    None          [id number]

## 2020-10-02 NOTE — PROGRESS NOTES
Notified surgery resident of pt's pain still 7-8/10 and elevated BP. Redosed pt with oxycodone 10 mg at 1800, no other PRN IV pain meds ordered for breakthrough pain.  No new orders from MD at this time.

## 2020-10-02 NOTE — ANESTHESIA PREPROCEDURE EVALUATION
[facility]  Anesthesia Pre-Operative Evaluation         Patient Name: Daquan Jansen  YOB: 1960  MRN: [id number]    SUBJECTIVE:     Pre-operative evaluation for Procedure(s) (LRB):  open Ileocolic resection (N/A)     10/02/2020    Daquan Jansen is a 60 y.o. male w/ a significant PMHx of crohns disease and colonic stricture.    Patient now presents for the above procedure(s).      LDA: None documented.       Peripheral IV - Single Lumen 10/02/20 0754 18 G Left Forearm (Active)   Site Assessment Clean;Dry;Intact;No redness;No swelling 10/02/20 0754   Line Status Blood return noted;Flushed 10/02/20 0754   Dressing Status Clean;Dry;Intact 10/02/20 0754   Dressing Intervention First dressing 10/02/20 0754   Number of days: 0        Prev airway: None documented.    Drips: None documented.       Patient Active Problem List   Diagnosis    Crohn's disease    Restless legs syndrome (RLS)    Colonic stricture       Review of patient's allergies indicates:  No Known Allergies    Current Outpatient Medications:    Current Facility-Administered Medications:     0.9%  NaCl infusion, , Intravenous, On Call Procedure, Shasta Weir NP    acetaminophen oral solution 976.601 mg, 976.601 mg, Oral, On Call Procedure, Shasta Weir NP    alvimopan capsule 12 mg, 12 mg, Oral, Once, Shasta Weir NP    cefTRIAXone (ROCEPHIN) 2 g/50 mL D5W IVPB, 2 g, Intravenous, On Call Procedure **AND** metronidazole IVPB 500 mg, 500 mg, Intravenous, On Call Procedure, Shasta Weir NP    gabapentin capsule 300 mg, 300 mg, Oral, On Call Procedure, Shasta Weir NP    heparin (porcine) injection 5,000 Units, 5,000 Units, Subcutaneous, Q8H, Shasta Weir NP    ibuprofen 100 mg/5 mL suspension 600 mg, 600 mg, Oral, On Call Procedure, Shasta Weir NP    lidocaine (PF) 10 mg/ml (1%) injection 10 mg, 1 mL, Intradermal, On Call Procedure, Shasta Weir NP    mupirocin 2 % ointment 1 g,  1 g, Nasal, On Call Procedure, Shasta Weir NP    Past Surgical History:   Procedure Laterality Date    APPENDECTOMY      Open, 29 years ago    COLONOSCOPY  Had a lot of them    None    COLONOSCOPY N/A 2020    Procedure: COLONOSCOPY;  Surgeon: Eduin Allan MD;  Location: [address];  Service: Endoscopy;  Laterality: N/A;  COVID test at [place] on -GT    LUMBAR FUSION      UPPER GASTROINTESTINAL ENDOSCOPY  Dont remember    In hospital recordes       Social History     Socioeconomic History    Marital status:      Spouse name: Not on file    Number of children: Not on file    Years of education: Not on file    Highest education level: Not on file   Occupational History    Not on file   Social Needs    Financial resource strain: Not very hard    Food insecurity     Worry: Never true     Inability: Never true    Transportation needs     Medical: No     Non-medical: No   Tobacco Use    Smoking status: Former Smoker     Packs/day: 1.50     Years: 45.00     Pack years: 67.50     Types: Cigarettes, Cigars     Start date: 1975     Quit date: 2002     Years since quittin.1    Smokeless tobacco: Current User     Types: Chew    Tobacco comment: Still dip   Substance and Sexual Activity    Alcohol use: Never     Frequency: Never     Drinks per session: Patient refused     Binge frequency: Never    Drug use: Never    Sexual activity: Not Currently     Partners: Female     Birth control/protection: None     Comment: None   Lifestyle    Physical activity     Days per week: 5 days     Minutes per session: 150+ min    Stress: Only a little   Relationships    Social connections     Talks on phone: More than three times a week     Gets together: Once a week     Attends Holiness service: Not on file     Active member of club or organization: No     Attends meetings of clubs or organizations: Never     Relationship status:    Other Topics Concern    Not on  file   Social History Narrative    Not on file       OBJECTIVE:     Vital Signs Range (Last 24H):  Temp:  [36.7 °C (98 °F)]   Pulse:  [57]   Resp:  [16]   BP: (133)/(77)   SpO2:  [100 %]       Significant Labs:  Lab Results   Component Value Date    WBC 4.63 09/23/2020    HGB 13.2 (L) 09/23/2020    HCT 41.4 09/23/2020     09/23/2020    ALT 17 09/23/2020    AST 20 09/23/2020     09/23/2020    K 4.2 09/23/2020     09/23/2020    CREATININE 0.8 09/23/2020    BUN 7 09/23/2020    CO2 24 09/23/2020       Diagnostic Studies: No relevant studies.    EKG:   Results for orders placed or performed during the hospital encounter of 09/23/20   EKG 12-lead    Collection Time: 09/23/20  1:20 PM    Narrative    Test Reason : Z01.818,    Vent. Rate : 069 BPM     Atrial Rate : 069 BPM     P-R Int : 154 ms          QRS Dur : 088 ms      QT Int : 398 ms       P-R-T Axes : 057 -06 057 degrees     QTc Int : 426 ms    Normal sinus rhythm with sinus arrhythmia  Normal ECG  No previous ECGs available  Confirmed by Jh Cevallos MD ([id number]) on 9/23/2020 2:16:33 PM    Referred By: DIAN WALKER           Confirmed By:Jh Cevallos MD       2D ECHO:  TTE:  No results found for this or any previous visit.    PRISCILA:  No results found for this or any previous visit.    ASSESSMENT/PLAN:         Anesthesia Evaluation    I have reviewed the Patient Summary Reports.    I have reviewed the Nursing Notes. I have reviewed the NPO Status.      Review of Systems  Anesthesia Hx:  No problems with previous Anesthesia  History of prior surgery of interest to airway management or planning: Denies Family Hx of Anesthesia complications.   Denies Personal Hx of Anesthesia complications.   Hematology/Oncology:         -- Denies Anemia:   Cardiovascular:   Exercise tolerance: good Hypertension Denies CAD.       Pulmonary:   Denies COPD.  Denies Asthma.  Denies Sleep Apnea.    Renal/:   Denies Chronic Renal Disease.     Hepatic/GI:   Denies GERD.  Denies Liver Disease.    Neurological:   Denies TIA. Denies CVA. Denies Seizures.    Endocrine:   Denies Diabetes.        Physical Exam  General:  Well nourished    Airway/Jaw/Neck:  Airway Findings: Mouth Opening: Normal Tongue: Normal  General Airway Assessment: Adult  Mallampati: II  Improves to II with phonation.  TM Distance: Normal, at least 6 cm  Jaw/Neck Findings:  Neck ROM: Normal ROM      Dental:  Dental Findings: In tact   Chest/Lungs:  Chest/Lungs Findings: Clear to auscultation, Normal Respiratory Rate     Heart/Vascular:  Heart Findings: Rate: Normal  Rhythm: Regular Rhythm  Sounds: Normal        Mental Status:  Mental Status Findings:  Cooperative, Alert and Oriented         Anesthesia Plan  Type of Anesthesia, risks & benefits discussed:  Anesthesia Type:  general  Patient's Preference:   Intra-op Monitoring Plan: standard ASA monitors  Intra-op Monitoring Plan Comments:   Post Op Pain Control Plan: per primary service following discharge from PACU and multimodal analgesia  Post Op Pain Control Plan Comments:   Induction:   IV  Beta Blocker:  Patient is not currently on a Beta-Blocker (No further documentation required).       Informed Consent: Patient understands risks and agrees with Anesthesia plan.  Questions answered.   ASA Score: 2     Day of Surgery Review of History & Physical:    H&P update referred to the provider.         Ready For Surgery From Anesthesia Perspective.

## 2020-10-02 NOTE — ANESTHESIA PROCEDURE NOTES
Intubation  Performed by: Stacie Newman MD  Authorized by: Trenton He MD     Intubation:     Induction:  Intravenous    Intubated:  Postinduction    Mask Ventilation:  Easy mask    Attempts:  1    Attempted By:  Resident anesthesiologist    Method of Intubation:  Direct    Blade:  Dave 3    Laryngeal View Grade: Grade I - full view of chords      Difficult Airway Encountered?: No      Complications:  None    Airway Device:  Oral endotracheal tube    Airway Device Size:  7.5    Style/Cuff Inflation:  Cuffed    Tube secured:  22    Secured at:  The lips    Placement Verified By:  Capnometry    Complicating Factors:  None    Findings Post-Intubation:  BS equal bilateral

## 2020-10-02 NOTE — PROGRESS NOTES
Patient assigned to this writer by charge nurse. The patient was  escorted to [facility] room 11. The patient is currently  changing into a hospital gown. This writer is completing a chart review and reviewing MD orders.

## 2020-10-03 LAB
AMYLASE SERPL-CCNC: 608 U/L (ref 20–110)
ANION GAP SERPL CALC-SCNC: 17 MMOL/L (ref 8–16)
BASOPHILS # BLD AUTO: 0.02 K/UL (ref 0–0.2)
BASOPHILS NFR BLD: 0.2 % (ref 0–1.9)
BUN SERPL-MCNC: 11 MG/DL (ref 6–20)
CALCIUM SERPL-MCNC: 9.3 MG/DL (ref 8.7–10.5)
CHLORIDE SERPL-SCNC: 106 MMOL/L (ref 95–110)
CO2 SERPL-SCNC: 17 MMOL/L (ref 23–29)
CREAT SERPL-MCNC: 0.8 MG/DL (ref 0.5–1.4)
DIFFERENTIAL METHOD: ABNORMAL
EOSINOPHIL # BLD AUTO: 0 K/UL (ref 0–0.5)
EOSINOPHIL NFR BLD: 0.1 % (ref 0–8)
ERYTHROCYTE [DISTWIDTH] IN BLOOD BY AUTOMATED COUNT: 13.2 % (ref 11.5–14.5)
EST. GFR  (AFRICAN AMERICAN): >60 ML/MIN/1.73 M^2
EST. GFR  (NON AFRICAN AMERICAN): >60 ML/MIN/1.73 M^2
GLUCOSE SERPL-MCNC: 116 MG/DL (ref 70–110)
HCT VFR BLD AUTO: 40.6 % (ref 40–54)
HGB BLD-MCNC: 13.2 G/DL (ref 14–18)
IMM GRANULOCYTES # BLD AUTO: 0.03 K/UL (ref 0–0.04)
IMM GRANULOCYTES NFR BLD AUTO: 0.2 % (ref 0–0.5)
LIPASE SERPL-CCNC: 12 U/L (ref 4–60)
LYMPHOCYTES # BLD AUTO: 1.2 K/UL (ref 1–4.8)
LYMPHOCYTES NFR BLD: 9.2 % (ref 18–48)
MAGNESIUM SERPL-MCNC: 1.8 MG/DL (ref 1.6–2.6)
MCH RBC QN AUTO: 29.1 PG (ref 27–31)
MCHC RBC AUTO-ENTMCNC: 32.5 G/DL (ref 32–36)
MCV RBC AUTO: 89 FL (ref 82–98)
MONOCYTES # BLD AUTO: 1.1 K/UL (ref 0.3–1)
MONOCYTES NFR BLD: 8.7 % (ref 4–15)
NEUTROPHILS # BLD AUTO: 10.2 K/UL (ref 1.8–7.7)
NEUTROPHILS NFR BLD: 81.6 % (ref 38–73)
NRBC BLD-RTO: 0 /100 WBC
PHOSPHATE SERPL-MCNC: 3 MG/DL (ref 2.7–4.5)
PLATELET # BLD AUTO: 290 K/UL (ref 150–350)
PMV BLD AUTO: 10.7 FL (ref 9.2–12.9)
POTASSIUM SERPL-SCNC: 4.1 MMOL/L (ref 3.5–5.1)
RBC # BLD AUTO: 4.54 M/UL (ref 4.6–6.2)
SODIUM SERPL-SCNC: 140 MMOL/L (ref 136–145)
WBC # BLD AUTO: 12.47 K/UL (ref 3.9–12.7)

## 2020-10-03 PROCEDURE — 20600001 HC STEP DOWN PRIVATE ROOM

## 2020-10-03 PROCEDURE — 36415 COLL VENOUS BLD VENIPUNCTURE: CPT

## 2020-10-03 PROCEDURE — 25000003 PHARM REV CODE 250: Performed by: SURGERY

## 2020-10-03 PROCEDURE — 80048 BASIC METABOLIC PNL TOTAL CA: CPT

## 2020-10-03 PROCEDURE — 97165 OT EVAL LOW COMPLEX 30 MIN: CPT

## 2020-10-03 PROCEDURE — 83735 ASSAY OF MAGNESIUM: CPT

## 2020-10-03 PROCEDURE — 25000003 PHARM REV CODE 250: Performed by: STUDENT IN AN ORGANIZED HEALTH CARE EDUCATION/TRAINING PROGRAM

## 2020-10-03 PROCEDURE — 83690 ASSAY OF LIPASE: CPT

## 2020-10-03 PROCEDURE — 82150 ASSAY OF AMYLASE: CPT

## 2020-10-03 PROCEDURE — 63600175 PHARM REV CODE 636 W HCPCS: Performed by: STUDENT IN AN ORGANIZED HEALTH CARE EDUCATION/TRAINING PROGRAM

## 2020-10-03 PROCEDURE — 97161 PT EVAL LOW COMPLEX 20 MIN: CPT

## 2020-10-03 PROCEDURE — 85025 COMPLETE CBC W/AUTO DIFF WBC: CPT

## 2020-10-03 PROCEDURE — 63600175 PHARM REV CODE 636 W HCPCS: Performed by: SURGERY

## 2020-10-03 PROCEDURE — 84100 ASSAY OF PHOSPHORUS: CPT

## 2020-10-03 PROCEDURE — 99900035 HC TECH TIME PER 15 MIN (STAT)

## 2020-10-03 PROCEDURE — 94761 N-INVAS EAR/PLS OXIMETRY MLT: CPT

## 2020-10-03 PROCEDURE — S0028 INJECTION, FAMOTIDINE, 20 MG: HCPCS | Performed by: STUDENT IN AN ORGANIZED HEALTH CARE EDUCATION/TRAINING PROGRAM

## 2020-10-03 RX ORDER — DEXTROSE MONOHYDRATE, SODIUM CHLORIDE, AND POTASSIUM CHLORIDE 50; 1.49; 4.5 G/1000ML; G/1000ML; G/1000ML
INJECTION, SOLUTION INTRAVENOUS CONTINUOUS
Status: DISCONTINUED | OUTPATIENT
Start: 2020-10-03 | End: 2020-10-07

## 2020-10-03 RX ORDER — NALOXONE HCL 0.4 MG/ML
0.02 VIAL (ML) INJECTION
Status: DISCONTINUED | OUTPATIENT
Start: 2020-10-03 | End: 2020-10-06

## 2020-10-03 RX ORDER — FAMOTIDINE 10 MG/ML
20 INJECTION INTRAVENOUS 2 TIMES DAILY
Status: DISCONTINUED | OUTPATIENT
Start: 2020-10-03 | End: 2020-10-06

## 2020-10-03 RX ORDER — ONDANSETRON 2 MG/ML
8 INJECTION INTRAMUSCULAR; INTRAVENOUS EVERY 6 HOURS
Status: DISCONTINUED | OUTPATIENT
Start: 2020-10-03 | End: 2020-10-07 | Stop reason: HOSPADM

## 2020-10-03 RX ORDER — PANTOPRAZOLE SODIUM 40 MG/1
40 TABLET, DELAYED RELEASE ORAL DAILY
Status: DISCONTINUED | OUTPATIENT
Start: 2020-10-03 | End: 2020-10-07 | Stop reason: HOSPADM

## 2020-10-03 RX ORDER — HYDROMORPHONE HCL IN 0.9% NACL 6 MG/30 ML
PATIENT CONTROLLED ANALGESIA SYRINGE INTRAVENOUS CONTINUOUS
Status: DISCONTINUED | OUTPATIENT
Start: 2020-10-03 | End: 2020-10-06

## 2020-10-03 RX ORDER — HYDROMORPHONE HYDROCHLORIDE 1 MG/ML
0.5 INJECTION, SOLUTION INTRAMUSCULAR; INTRAVENOUS; SUBCUTANEOUS ONCE
Status: COMPLETED | OUTPATIENT
Start: 2020-10-03 | End: 2020-10-03

## 2020-10-03 RX ORDER — SCOLOPAMINE TRANSDERMAL SYSTEM 1 MG/1
1 PATCH, EXTENDED RELEASE TRANSDERMAL
Status: DISCONTINUED | OUTPATIENT
Start: 2020-10-03 | End: 2020-10-07 | Stop reason: HOSPADM

## 2020-10-03 RX ADMIN — ACETAMINOPHEN 1000 MG: 10 INJECTION, SOLUTION INTRAVENOUS at 02:10

## 2020-10-03 RX ADMIN — GABAPENTIN 300 MG: 300 CAPSULE ORAL at 08:10

## 2020-10-03 RX ADMIN — PANTOPRAZOLE SODIUM 40 MG: 40 TABLET, DELAYED RELEASE ORAL at 08:10

## 2020-10-03 RX ADMIN — TRAMADOL HYDROCHLORIDE 50 MG: 50 TABLET, FILM COATED ORAL at 02:10

## 2020-10-03 RX ADMIN — Medication: at 10:10

## 2020-10-03 RX ADMIN — ONDANSETRON 8 MG: 2 INJECTION INTRAMUSCULAR; INTRAVENOUS at 08:10

## 2020-10-03 RX ADMIN — HYDROMORPHONE HYDROCHLORIDE 0.5 MG: 1 INJECTION, SOLUTION INTRAMUSCULAR; INTRAVENOUS; SUBCUTANEOUS at 01:10

## 2020-10-03 RX ADMIN — METOPROLOL SUCCINATE 25 MG: 25 TABLET, EXTENDED RELEASE ORAL at 08:10

## 2020-10-03 RX ADMIN — GABAPENTIN 300 MG: 300 CAPSULE ORAL at 02:10

## 2020-10-03 RX ADMIN — ONDANSETRON 8 MG: 2 INJECTION INTRAMUSCULAR; INTRAVENOUS at 02:10

## 2020-10-03 RX ADMIN — PROMETHAZINE HYDROCHLORIDE 6.25 MG: 25 INJECTION INTRAMUSCULAR; INTRAVENOUS at 08:10

## 2020-10-03 RX ADMIN — ACETAMINOPHEN 1000 MG: 500 TABLET ORAL at 10:10

## 2020-10-03 RX ADMIN — POTASSIUM CHLORIDE, DEXTROSE MONOHYDRATE AND SODIUM CHLORIDE: 150; 5; 450 INJECTION, SOLUTION INTRAVENOUS at 03:10

## 2020-10-03 RX ADMIN — ACETAMINOPHEN 1000 MG: 10 INJECTION, SOLUTION INTRAVENOUS at 06:10

## 2020-10-03 RX ADMIN — FAMOTIDINE 20 MG: 10 INJECTION INTRAVENOUS at 08:10

## 2020-10-03 RX ADMIN — SCOPALAMINE 1 PATCH: 1 PATCH, EXTENDED RELEASE TRANSDERMAL at 08:10

## 2020-10-03 RX ADMIN — PROMETHAZINE HYDROCHLORIDE 6.25 MG: 25 INJECTION INTRAMUSCULAR; INTRAVENOUS at 03:10

## 2020-10-03 RX ADMIN — MUPIROCIN: 20 OINTMENT TOPICAL at 08:10

## 2020-10-03 RX ADMIN — OXYCODONE HYDROCHLORIDE 10 MG: 10 TABLET ORAL at 05:10

## 2020-10-03 NOTE — PLAN OF CARE
Problem: Occupational Therapy Goal  Goal: Occupational Therapy Goal  Description: Goals to be met by: 10/13/20    Patient will increase functional independence with ADLs by performing:    UE Dressing with Set-up Assistance.  LE Dressing with Set-up Assistance.  Grooming while standing at sink with Modified [place].  Toileting from toilet with Modified [place] for hygiene and clothing management.   Step transfer with Modified [place] and AD as needed to prepare for household mobility to complete occupations of choice.   Toilet transfer to toilet with Modified [place].    Outcome: Ongoing, Progressing     OT evaluation complete and POC established. See evaluation note for further details.   Disposition recommendation: Home and no needs    Karuna Martinez OTR/L  10/3/20

## 2020-10-03 NOTE — PROGRESS NOTES
"[facility]  Colorectal Surgery  Progress Note    Patient Name: Daquan Jansen  MRN: [id number]  Admission Date: 10/2/2020  Hospital Length of Stay: 1 days  Attending Physician: DIAN Arreola MD    Subjective:     Interval History: Overnight patient had n/v and uncontrolled pain. States his stomach acid is "burning him alive". -flatus/BM.    Post-Op Info:  Procedure(s) (LRB):  LAPAROSCOPIC ILEOCOLIC RESECTION (N/A)   1 Day Post-Op      Medications:  Continuous Infusions:   hydromorphone in 0.9 % NaCl 6 mg/30 ml       Scheduled Meds:   acetaminophen  1,000 mg Intravenous Q8H    Followed by    acetaminophen  1,000 mg Oral Q8H    famotidine (PF)  20 mg Intravenous BID    gabapentin  300 mg Oral TID    metoprolol succinate  25 mg Oral Daily    mupirocin   Nasal BID    ondansetron  8 mg Intravenous Q6H    pantoprazole  40 mg Oral Daily    scopolamine  1 patch Transdermal Q3 Days     PRN Meds:   naloxone    oxyCODONE    oxyCODONE    promethazine (PHENERGAN) IVPB    sodium chloride 0.9%    sodium chloride 0.9%    traMADoL        Objective:     Vital Signs (Most Recent):  Temp: 96.9 °F (36.1 °C) (10/03/20 0838)  Pulse: 70 (10/03/20 0838)  Resp: 20 (10/03/20 0838)  BP: (!) 173/88 (10/03/20 0838)  SpO2: 98 % (10/03/20 0838) Vital Signs (24h Range):  Temp:  [96.6 °F (35.9 °C)-99.2 °F (37.3 °C)] 96.9 °F (36.1 °C)  Pulse:  [61-88] 70  Resp:  [11-20] 20  SpO2:  [96 %-100 %] 98 %  BP: (153-181)/(77-93) 173/88     Intake/Output - Last 3 Shifts       10/01 0700 - 10/02 0659 10/02 0700 - 10/03 0659 10/03 0700 - 10/04 0659    P.O.  720     I.V. (mL/kg)  2093 (29)     IV Piggyback  150     Total Intake(mL/kg)  2963 (41.1)     Urine (mL/kg/hr)  910 (0.5)     Total Output  910     Net  +2053                  Physical Exam  Constitutional:       General: He is in acute distress.   HENT:      Head: Normocephalic.   Cardiovascular:      Rate and Rhythm: Normal rate.   Pulmonary:      Effort: Pulmonary " effort is normal.   Abdominal:      General: There is distension.      Palpations: Abdomen is soft.      Tenderness: There is abdominal tenderness. There is no guarding.      Comments: Incisions c/d/i   Skin:     General: Skin is warm.   Neurological:      Mental Status: He is alert.       Significant Labs:  CBC (Last 3 Results):   Recent Labs   Lab 10/03/20  0606   WBC 12.47   RBC 4.54*   HGB 13.2*   HCT 40.6      MCV 89   MCH 29.1   MCHC 32.5     CMP (Last 3 Results):   Recent Labs   Lab 10/03/20  0303   *   CALCIUM 9.3      K 4.1   CO2 17*      BUN 11   CREATININE 0.8         Assessment/Plan:     * Stricture of small intestine  59yo M s/p ileocolic resection    - NPO  - mIVF  - Scheduled antiemetics + PRN  - Changed PO pain meds to PCA  - Flat/upright x-ray pending  - D/c roman  - If n/v does not improve will recheck labs this evening          Letha Prather MD  Colorectal Surgery  [facility]

## 2020-10-03 NOTE — PT/OT/SLP EVAL
"Occupational Therapy   Evaluation    Name: Daquan Jansen  MRN: [id number]  Admitting Diagnosis:  Stricture of small intestine 1 Day Post-Op    Recommendations:     Discharge Recommendations: home  Discharge Equipment Recommendations:  none  Barriers to discharge:  None    Assessment:     Daquan Jansen is a 60 y.o. male with a medical diagnosis of Stricture of small intestine.  He presents with the following performance deficits affecting function: weakness, impaired endurance, impaired functional mobilty, impaired balance, impaired self care skills. Pt tolerated session fairly well this date with fatigue and generalized weakness being his main functional limitations. Pt drowsey throughout the session requiring verbal cues for active participation. Once awake, pt cooperative and performed well. Pt performed bed mobility with min A, transfers with CGA, and short distance functional mobility with CGA. Pt with good rehab prognosis to return home with home health when medically stable. At this time, pt will benefit from skilled OT 3x/wk to build strength and endurance to increase independence in functional tasks and prevent further debility.       Rehab Prognosis: Good; patient would benefit from acute skilled OT services to address these deficits and reach maximum level of function.       Plan:     Patient to be seen 3 x/week to address the above listed problems via self-care/home management, therapeutic activities, therapeutic exercises  · Plan of Care Expires: 11/01/20  · Plan of Care Reviewed with: patient    Subjective     Chief Complaint: Fatigue, "I haven't really slept in two days."  Patient/Family Comments/goals: Pt agreeable to OT/PT evaluation and OT POC. "To get some rest."    Occupational Profile:  Living Environment: Pt lives with wife in a ST with 4 NIKOLE and no handrails. 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     Pain/Comfort:  · Pain Rating 1: 1/10  · Location - Orientation 1: generalized  · Location 1: abdomen  · Pain Addressed 1: Pre-medicate for activity, Reposition  · Pain Rating Post-Intervention 1: 0/10  · Pain Addressed 2: Cessation of Activity, Nurse notified    Patients cultural, spiritual, Yazdanism conflicts given the current situation: no    Objective:     Communicated with: RN prior to session.  Patient found HOB elevated with telemetry, peripheral IV, roman catheter, PCA upon OT entry to room.    General Precautions: Standard, fall   Orthopedic Precautions:N/A   Braces: N/A     Occupational Performance:    Bed Mobility:    · Patient completed Rolling/Turning to Left with  contact guard assistance and with side rail  · Patient completed Scooting/Bridging with contact guard assistance for anterior hip scooting EOB  · Patient completed Supine to Sit with minimum assistance for trunk elevation to reach upright sitting   · Patient completed Sit to Supine with minimum assistance for BLE guidance into bed     Functional Mobility/Transfers:  · Patient completed Sit <> Stand Transfer with contact guard assistance  with  hand-held assist   · STS from chair with CGA and HHA; decrease pace noted   · Patient completed Bed <> Chair Transfer using Step Transfer technique with contact guard assistance with hand-held assist  · Functional Mobility: Pt took few steps to reach BSC with CGA. No LOB noted.    Activities of Daily Living:  · Toileting: dependence with use of roman for voiding     Cognitive/Visual Perceptual:  Cognitive/Psychosocial Skills:     -       Oriented to: Person, Place, Time and Situation   -       Follows Commands/attention:Follows multistep  commands  -       Communication: clear/fluent  -       Memory: No Deficits  noted  -       Safety awareness/insight to disability: intact   -       Mood/Affect/Coping skills/emotional control: Appropriate to situation  Visual/Perceptual:      -Intact no defs noted    Physical Exam:  Balance:    -       EOB: SUP  Standing: CGA  Postural examination/scapula alignment:    -       Rounded shoulders  Skin integrity: Dry  Edema:  None noted  Sensation:    -       Intact  Upper Extremity Range of Motion:     -       Right Upper Extremity: WFL  -       Left Upper Extremity: WFL  Upper Extremity Strength:    -       Right Upper Extremity: WFL  -       Left Upper Extremity: WFL   Strength:    -       Right Upper Extremity: WFL  -       Left Upper Extremity: WFL    AMPAC 6 Click ADL:  AMPAC Total Score: 19    Treatment & Education:  - Role of OT/ OT POC  - Self care safety/ independence  - Functional transfer/ mobility safety  - Bed mobility safety  - Pursed lip breathing  - Importance of sitting up in chair as tolerated   - Left in supine due to nsg about to place NG tube  Education:    Patient left HOB elevated with all lines intact, call button in reach and RN notified    GOALS:   Multidisciplinary Problems     Occupational Therapy Goals        Problem: Occupational Therapy Goal    Goal Priority Disciplines Outcome Interventions   Occupational Therapy Goal     OT, PT/OT Ongoing, Progressing    Description: Goals to be met by: 10/13/20    Patient will increase functional independence with ADLs by performing:    UE Dressing with Set-up Assistance.  LE Dressing with Set-up Assistance.  Grooming while standing at sink with Modified [place].  Toileting from toilet with Modified [place] for hygiene and clothing management.   Step transfer with Modified [place] and AD as needed to prepare for household mobility to complete occupations of choice.   Toilet transfer to toilet with Modified [place].                     History:     Past Medical History:   Diagnosis Date    Arthritis -  hip     Chronic pain     Chronic, continuous use of opioids     Colon polyp 1994    Doctor said had a few small ones nothing to worry about    Crohn's disease     Food intolerance 1995    Anything with seeds    Hyperlipemia     Restless leg syndrome     Spinal stenosis        Past Surgical History:   Procedure Laterality Date    APPENDECTOMY      Open, 29 years ago    COLONOSCOPY  Had a lot of them    None    COLONOSCOPY N/A 9/8/2020    Procedure: COLONOSCOPY;  Surgeon: Eduin Allan MD;  Location: [address]);  Service: Endoscopy;  Laterality: N/A;  COVID test at [place] on 9/5-GT    LUMBAR FUSION      UPPER GASTROINTESTINAL ENDOSCOPY  Dont remember    In hospital recordes       Time Tracking:     OT Date of Treatment: 10/03/20  OT Start Time: 1117  OT Stop Time: 1131  OT Total Time (min): 14 min co eval with PT for initial assessment     Billable Minutes:Evaluation 14    Karuna Martinez, OT  10/3/2020

## 2020-10-03 NOTE — PT/OT/SLP EVAL
Physical Therapy Evaluation    Patient Name:  Daquan Jansen   MRN:  [id number]    Recommendations:     Discharge Recommendations:  home   Discharge Equipment Recommendations: none   Barriers to discharge: None    Assessment:     Daquan Jansen is a 60 y.o. male admitted with a medical diagnosis of Stricture of small intestine.  He presents with the following impairments/functional limitations:  weakness, impaired endurance, impaired functional mobilty, gait instability, impaired balance, pain . Patient tolerated session fairly well. He was quite drowsy throughout session.  Patient will benefit from PT services to address the mentioned deficits in order to promote an improve functional mobility status. Upon d/c, rec of home with no PT services warranted at d/c.    Rehab Prognosis: Good; patient would benefit from acute skilled PT services to address these deficits and reach maximum level of function.    Recent Surgery: Procedure(s) (LRB):  LAPAROSCOPIC ILEOCOLIC RESECTION (N/A) 1 Day Post-Op    Plan:     During this hospitalization, patient to be seen 3 x/week to address the identified rehab impairments via gait training, therapeutic activities, therapeutic exercises, neuromuscular re-education and progress toward the following goals:    · Plan of Care Expires:  11/02/20    History:     Past Medical History:   Diagnosis Date    Arthritis - hip     Chronic pain     Chronic, continuous use of opioids     Colon polyp 1994    Doctor said had a few small ones nothing to worry about    Crohn's disease     Food intolerance 1995    Anything with seeds    Hyperlipemia     Restless leg syndrome     Spinal stenosis        Past Surgical History:   Procedure Laterality Date    APPENDECTOMY      Open, 29 years ago    COLONOSCOPY  Had a lot of them    None    COLONOSCOPY N/A 9/8/2020    Procedure: COLONOSCOPY;  Surgeon: Eduin Allan MD;  Location: [address];  Service: Endoscopy;  Laterality: N/A;   COVID test at [place] on 9/5-GT    LUMBAR FUSION      UPPER GASTROINTESTINAL ENDOSCOPY  Dont remember    In hospital recordes       Subjective     Chief Complaint: stomach pain   Patient/Family Comments/goals: to go home   Pain/Comfort:  · Pain Rating 1: 1/10  · Location 1: (abdomen)  · Pain Addressed 1: Reposition, Distraction, Cessation of Activity  · Pain Rating Post-Intervention 1: 1/10    Patients cultural, spiritual, Anabaptist conflicts given the current situation: no    Living Environment:  Patient's living environment is as follows:  Home type home   1 or 2 stories  [facility]   Number of NIKOLE/ rails 4 NIKOLE (no handrails)    AD used?/Owned?  Shower chair   Bathroom set-up  Tub/shower   Working? no   Driving? no   Individuals living with patient:  Spouse    Hobbies/Roles: None stated    Prior to admission, patients level of function was (I) for ADLs and mobility.  Equipment used at home: shower chair.  DME owned (not currently used): none.  Upon discharge, patient will have assistance from spouse.    Objective:     Communicated with RN prior to session.  Patient found HOB elevated with telemetry, peripheral IV, PCA, roman catheter  upon PT entry to room. See below for detailed functional assessment. Patient agreeable to participate in initial evaluation.    General Precautions: Standard, fall   Orthopedic Precautions:N/A   Braces: N/A     Exams:  · Cognitive Exam:  Patient is oriented to Person, Place, Time and Situation  · Postural Exam:  Patient presented with the following abnormalities:    · -       Rounded shoulders  · -       Forward head  · Sensation:    LEFT LE: -       Intact  light/touch LE RIGHT LE:-       Intact  light/touch LE      ROM and Strength   Right Lower Extremity  Left Lower Extremity    Hip Flexion WFL WFL   Knee Extension  WFL WFL   Knee Flexion  WFL WFL   Ankle DF WFL WFL   Ankle PF  WFL WFL     Functional Mobility:  · Bed Mobility:     · Rolling Left:  contact guard  assistance  · Scooting: stand by assistance  · Supine to Sit: minimum assistance  · Sit to Supine: contact guard assistance  · Transfers:     · Sit to Stand:  contact guard assistance with hand-held assist  · Bed to Chair: contact guard assistance with  hand-held assist  using  Step Transfer  · Gait: 5 steps x2 EOB<>bedside chair  ·  decreased estiven, guarded due to pain, FFP, narrow JUDI, mildly unsteady, no LOB   ·  once in chair - RN present and reported that patient needs to be returned to bed 2* need for NGT   · Balance: sitting EOB - SBA; static standing - CGA; dynamic standing - CGA     Therapeutic Activities and Exercises:  -Patient educated on the role and goal of PT services during acute care LOS. Question and concerns acknowledged and answered as appropriate.   -Will continue to educated as needed.    -White board updated in patients room to reflect level of assistance needed with nursing. RNx1     AM-PAC 6 CLICK MOBILITY  Total Score:19     Patient left HOB elevated with all lines intact, call button in reach and RN present.  White board updated in patient room to reflect level of function with nursing.     GOALS:   Multidisciplinary Problems     Physical Therapy Goals        Problem: Physical Therapy Goal    Goal Priority Disciplines Outcome Goal Variances Interventions   Physical Therapy Goal     PT, PT/OT Ongoing, Progressing     Description: Goals to be met by: 10/13/20     Patient will increase functional independence with mobility by performin. Supine to sit with Modified [place]  2. Sit to supine with Modified [place]  3. Sit to stand transfer with Modified [place]  4. Gait  x 200 feet with Modified [place] using LRAD, if needed.   5. Ascend/descend 4 stairs with Modified [place]                     Time Tracking:     PT Received On: 10/03/20  PT Start Time: 1116     PT Stop Time: 1131  PT Total Time (min): 15 min     Billable Minutes: Evaluation  15min      Kati Perry, PT  10/03/2020

## 2020-10-03 NOTE — NURSING
Pt actively vomiting, Zofran, phenergan, and scopolomine patch given, vomiting controlled at 11:00 am. Physician saw pt at bedside, abd x-ray ordered awaiting results. Pt currently in bed resting.

## 2020-10-03 NOTE — NURSING TRANSFER
Nursing Transfer Note      10/3/2020     Transfer To: Radiology     Transfer via stretcher    Transfer with IV pole    Transported by transport    Medicines sent: none    Chart send with patient: Yes    Notified: daughter    Patient reassessed at: n/a     Upon arrival to floor: bed in lowest position

## 2020-10-03 NOTE — SUBJECTIVE & OBJECTIVE
"  Subjective:     Interval History: Overnight patient had n/v and uncontrolled pain. States his stomach acid is "burning him alive". -flatus/BM.    Post-Op Info:  Procedure(s) (LRB):  LAPAROSCOPIC ILEOCOLIC RESECTION (N/A)   1 Day Post-Op      Medications:  Continuous Infusions:   hydromorphone in 0.9 % NaCl 6 mg/30 ml       Scheduled Meds:   acetaminophen  1,000 mg Intravenous Q8H    Followed by    acetaminophen  1,000 mg Oral Q8H    famotidine (PF)  20 mg Intravenous BID    gabapentin  300 mg Oral TID    metoprolol succinate  25 mg Oral Daily    mupirocin   Nasal BID    ondansetron  8 mg Intravenous Q6H    pantoprazole  40 mg Oral Daily    scopolamine  1 patch Transdermal Q3 Days     PRN Meds:   naloxone    oxyCODONE    oxyCODONE    promethazine (PHENERGAN) IVPB    sodium chloride 0.9%    sodium chloride 0.9%    traMADoL        Objective:     Vital Signs (Most Recent):  Temp: 96.9 °F (36.1 °C) (10/03/20 0838)  Pulse: 70 (10/03/20 0838)  Resp: 20 (10/03/20 0838)  BP: (!) 173/88 (10/03/20 0838)  SpO2: 98 % (10/03/20 0838) Vital Signs (24h Range):  Temp:  [96.6 °F (35.9 °C)-99.2 °F (37.3 °C)] 96.9 °F (36.1 °C)  Pulse:  [61-88] 70  Resp:  [11-20] 20  SpO2:  [96 %-100 %] 98 %  BP: (153-181)/(77-93) 173/88     Intake/Output - Last 3 Shifts       10/01 0700 - 10/02 0659 10/02 0700 - 10/03 0659 10/03 0700 - 10/04 0659    P.O.  720     I.V. (mL/kg)  2093 (29)     IV Piggyback  150     Total Intake(mL/kg)  2963 (41.1)     Urine (mL/kg/hr)  910 (0.5)     Total Output  910     Net  +2053                  Physical Exam  Constitutional:       General: He is in acute distress.   HENT:      Head: Normocephalic.   Cardiovascular:      Rate and Rhythm: Normal rate.   Pulmonary:      Effort: Pulmonary effort is normal.   Abdominal:      General: There is distension.      Palpations: Abdomen is soft.      Tenderness: There is abdominal tenderness. There is no guarding.      Comments: Incisions c/d/i   Skin:     " General: Skin is warm.   Neurological:      Mental Status: He is alert.       Significant Labs:  CBC (Last 3 Results):   Recent Labs   Lab 10/03/20  0606   WBC 12.47   RBC 4.54*   HGB 13.2*   HCT 40.6      MCV 89   MCH 29.1   MCHC 32.5     CMP (Last 3 Results):   Recent Labs   Lab 10/03/20  0303   *   CALCIUM 9.3      K 4.1   CO2 17*      BUN 11   CREATININE 0.8

## 2020-10-03 NOTE — PLAN OF CARE
Problem: Physical Therapy Goal  Goal: Physical Therapy Goal  Description: Goals to be met by: 10/13/20     Patient will increase functional independence with mobility by performin. Supine to sit with Modified [place]  2. Sit to supine with Modified [place]  3. Sit to stand transfer with Modified [place]  4. Gait  x 200 feet with Modified [place] using LRAD, if needed.   5. Ascend/descend 4 stairs with Modified [place]    Outcome: Ongoing, Progressing   Initial evaluation completed. Patient tolerated assessment well. Established POC and goals. Patient would continue to benefit from skilled PT services in order to improve functional mobility independence.       Kati Perry, PT, DPT  10/3/2020

## 2020-10-03 NOTE — OP NOTE
Date of procedure:   October 2, 2020    Indications for procedure:  6-year-old male with history of Crohn's disease, ileal distribution with fibrosis stenosing phenotype.  He has developed an intractable stricture.  It has been recommended by Gastroenterology that he proceed to resection.  The details of the procedure, functional consequences, expected recuperation and hospitalization, and the potential risks of surgery have been discussed in detail.    Preoperative diagnosis:   Crohn's disease, ileitis with stricture    Postoperative diagnosis:  Same    Name of procedure:  Laparoscopic-assisted ileocolic resection with ileal to ascending colon anastomosis (Kono-S type), omental wrap of anastomosis    Surgeon:   DIAN Arreola MD    Assistant surgeon:  Glenn Perdue MD and Yahir Moran MD    Type of anesthesia:  GETA    EBL:  100  Cc's    Drains:  none    Specimen:  Ileocolic resection    Findings:  1.  Ileitis with stricture requiring ileocolic resection.    2.  No evidence of mucosal ulceration at the margins of resection  3.  Proximal small bowel normal    Technique in detail:  Patient was brought to the operating room positive identified and placed on the operating table in the supine position with sequential compression devices on his lower extremities.  The patient had undergone an outpatient oral mechanical and oral antibiotic bowel preparation.  He received intravenous antibiotics.  He received subcutaneous aqueous heparin.  He was on a patient positioning system.  He underwent general endotracheal anesthesia without complication.  Ferreira catheter and orogastric tube were inserted.  Both arms were tucked at the side.  He was placed in the modified lithotomy position padded Yellofin stirrups.  A strap was placed across his upper torso fixing him to the operating room table.  His abdomen and perineum were prepared and draped in the usual sterile fashion.    The critical time-out was performed.    7  cm incision was made in the midline above and below the umbilicus.  Dissection was carried down through the umbilicus into the peritoneal cavity and the linea alba was opened along the length of the skin incision.  5 mm trocars were then placed into the peritoneal cavity in the left upper quadrant and in the left lower quadrant lateral to the rectus sheath.  A 5 mm trocar was placed in the midline side of xiphoid area.  Transversus abdominis plane block was performed using bupivacaine and lidocaine solution with epinephrine.  There was noted be evidence of adhesions of the cecum and terminal ileum in the right lower quadrant consistent with the patient's known terminal ileal disease and history of prior appendectomy.  The omentum was dissected off of the transverse colon was placed into the upper abdomen.  A GelPort was placed in the midline wound.  A 12 mm trocar was placed through the GelPort.  Pneumoperitoneum was achieved by insufflation of carbon dioxide gas.  Pressures were monitored throughout the procedure.  The patient was placed in left-side down position and in slight reverse Trendelenburg position.  Video laparoscopy confirmed adhesions of the small bowel to the inflammatory process at the ileocecal region.  There were noted be dense adhesions to the retroperitoneum and right lateral abdominal wall in the right lower quadrant.  We dissected the omentum off of the proximal transverse colon using the Harmonic scalpel.  The hepatic flexure was then mobilized off the retroperitoneum and the sweep of the duodenum which was carefully preserved from harm.  We then mobilized the ascending colon in a lateral to medial fashion from superior to inferior off the retroperitoneum back to the root of the small bowel mesentery.  We encountered the dense adhesions in the right lower quadrant.  We proceeded to extend the incision inferiorly and we performed this portion of the dissection under open technique given the  fibrosis and markedly abnormal anatomy.  A large Michael retractor was placed into the wound.  We were able to mobilize the inflammatory mass of tissue off of the retroperitoneum with care being taken to preserve the right gonadal vessel, right ureter and other retroperitoneal structures.  The root of the small bowel mesentery was dissected back to the 3rd portion of the duodenum completely mobilizing the ascending colon and root of the small bowel mesentery.  The bowel was delivered onto the abdominal wall.  We dissected loops of small bowel which were normal but adherent to the inflammatory process in the terminal ileum.  We preserved this much small bowel as possible.  We ran the small bowel from the area of abnormality to the ligament of Treitz and there was no evidence of any proximal disease.  We could clearly delineate abnormal mesentery and a step-off to normal mesentery indicating the transition of normal to abnormal bowel.  The ascending colon was noted to be unremarkable.  We transected the bowel proximally and distally in these areas of normal bowel using the linear 75 mm stapler with blue cartridges.  The bowel was transected transversely in the plane perpendicular to the axis of the mesentery in anticipation of performing Kono-S anti mesenteric anastomosis.  The mesentery of the bowel to be resected was divided close to the bowel wall using the Harmonic scalpel.  The ileocolic vessel was oversewn with Vicryl suture.  The specimen was handed off the operative field and opened on a back table.  We opened the specimen and confirmed that the margins of resection were normal without evidence of active Crohn's disease.  The area of strictured ileum was notable for fibrosis.  There is no evidence of a mass.  This appeared to be fibrosed stenosing disease.    Anastomosis was undertaken.  The stapled ends of bowel were approximated as a supporting column using interrupted Vicryl suture.  1 cm away from the  stapled ends of bowel the respective portions of bowel were opened longitudinally for approximately 8 cm in both the ileum and the colon.  We then performed a hand-sewn anastomosis transversely using interrupted 3 0 Vicryl suture as a single layer.  The posterior layer was performed inverting the mucosa with the knots being tied on the inside of the bowel.  At the corners we transitioned to knots being tied on the outside of the bowel again with purchases of  serosa and submucosa and minimal mucosa to invert the mucosa.  Excellent blood supply was noted to both portions of bowel.  We then wrapped the anastomosis with omentum and this was sutured into place with 3 0 Vicryl.  The anastomosis was returned to the peritoneal cavity and the small bowel was returned to its anatomic position.  Hemostasis was assured.    We changed gown and gloves and used a separate closing tray to close the abdomen.  The linea alba was approximated using continuous 1 PDS suture which was run from either end of the wound and tied in the middle.  All skin incisions were approximated using subcuticular Monocryl suture and skin glue.    The patient tolerated the procedure well and there were no complications noted.  All sponges and instrument counts were noted to be correct.  The patient was awakened from anesthesia extubated without incident and returned to the recovery area in stable condition.  I was scrubbed and present for the entire operation.    DIAN Arreola MD

## 2020-10-03 NOTE — NURSING TRANSFER
Nursing Transfer Note      10/3/2020     Transfer From: Radiology     Transfer via stretcher    Transfer with IV pole    Transported by transportation    Medicines sent: n/a     Chart send with patient: Yes    Notified: daughter    Patient reassessed at: 10/03,10:50    Upon arrival to floor: patient oriented to room, call bell in reach and bed in lowest position, PCA pump initiated.

## 2020-10-03 NOTE — NURSING
Patient rounds made more than every hour.  No complaints of chest pain or shortness of breath.  Oxycodone 10 mg given by mouth for complaint of right side abdominal pain.  Patient stated this medication is not working.  During the night patient was given Dilaudid 0.5 mg - patient stated this medication did not help either; however, 45 minutes after giving Dilaudid I walked in the room and the patient was sleeping. Patient stated he is not going home until his pain is under control. SBP remained less than 180.  Patient complained of stomach acid. Surgery intern called and order were given.  Remained free from fall and incidents this shift.

## 2020-10-03 NOTE — ASSESSMENT & PLAN NOTE
61yo M s/p ileocolic resection    - NPO  - mIVF  - Scheduled antiemetics + PRN  - Changed PO pain meds to PCA  - Flat/upright x-ray pending  - D/c roman  - If n/v does not improve will recheck labs this evening

## 2020-10-03 NOTE — PLAN OF CARE
A&Ox4. VVS on RA, /77 call MD if systolic above 180. N/V continuously in am, resolved by 11am. Pain managed with PCA. NGT placed currently @ LIWS, tolerating well. Active ROM performed. Adequate UOP per roman to gravity, removed, dtv by 11pm. Pt currently resting comfortably, PCA button and call light in reach, bed in lowest position. WCTM.     Problem: Adult Inpatient Plan of Care  Goal: Plan of Care Review  Outcome: Ongoing, Progressing  Goal: Patient-Specific Goal (Individualization)  Outcome: Ongoing, Progressing  Goal: Absence of Hospital-Acquired Illness or Injury  Outcome: Ongoing, Progressing  Goal: Optimal Comfort and Wellbeing  Outcome: Ongoing, Progressing  Goal: Readiness for Transition of Care  Outcome: Ongoing, Progressing  Goal: Rounds/Family Conference  Outcome: Ongoing, Progressing     Problem: Fall Injury Risk  Goal: Absence of Fall and Fall-Related Injury  Outcome: Ongoing, Progressing     Problem: Infection  Goal: Infection Symptom Resolution  Outcome: Ongoing, Progressing     Problem: Skin Injury Risk Increased  Goal: Skin Health and Integrity  Outcome: Ongoing, Progressing

## 2020-10-04 LAB
ALBUMIN SERPL BCP-MCNC: 3.5 G/DL (ref 3.5–5.2)
ALP SERPL-CCNC: 79 U/L (ref 55–135)
ALT SERPL W/O P-5'-P-CCNC: 13 U/L (ref 10–44)
ANION GAP SERPL CALC-SCNC: 10 MMOL/L (ref 8–16)
AST SERPL-CCNC: 19 U/L (ref 10–40)
BASOPHILS # BLD AUTO: 0.04 K/UL (ref 0–0.2)
BASOPHILS NFR BLD: 0.3 % (ref 0–1.9)
BILIRUB SERPL-MCNC: 0.6 MG/DL (ref 0.1–1)
BUN SERPL-MCNC: 9 MG/DL (ref 6–20)
CALCIUM SERPL-MCNC: 8.7 MG/DL (ref 8.7–10.5)
CHLORIDE SERPL-SCNC: 103 MMOL/L (ref 95–110)
CO2 SERPL-SCNC: 24 MMOL/L (ref 23–29)
CREAT SERPL-MCNC: 0.8 MG/DL (ref 0.5–1.4)
CRP SERPL-MCNC: 73.8 MG/L (ref 0–8.2)
DIFFERENTIAL METHOD: ABNORMAL
EOSINOPHIL # BLD AUTO: 0 K/UL (ref 0–0.5)
EOSINOPHIL NFR BLD: 0.1 % (ref 0–8)
ERYTHROCYTE [DISTWIDTH] IN BLOOD BY AUTOMATED COUNT: 13.2 % (ref 11.5–14.5)
EST. GFR  (AFRICAN AMERICAN): >60 ML/MIN/1.73 M^2
EST. GFR  (NON AFRICAN AMERICAN): >60 ML/MIN/1.73 M^2
GLUCOSE SERPL-MCNC: 120 MG/DL (ref 70–110)
HCT VFR BLD AUTO: 39.7 % (ref 40–54)
HGB BLD-MCNC: 12.5 G/DL (ref 14–18)
IMM GRANULOCYTES # BLD AUTO: 0.03 K/UL (ref 0–0.04)
IMM GRANULOCYTES NFR BLD AUTO: 0.3 % (ref 0–0.5)
LYMPHOCYTES # BLD AUTO: 2 K/UL (ref 1–4.8)
LYMPHOCYTES NFR BLD: 17.2 % (ref 18–48)
MCH RBC QN AUTO: 28.8 PG (ref 27–31)
MCHC RBC AUTO-ENTMCNC: 31.5 G/DL (ref 32–36)
MCV RBC AUTO: 92 FL (ref 82–98)
MONOCYTES # BLD AUTO: 1.1 K/UL (ref 0.3–1)
MONOCYTES NFR BLD: 9.8 % (ref 4–15)
NEUTROPHILS # BLD AUTO: 8.4 K/UL (ref 1.8–7.7)
NEUTROPHILS NFR BLD: 72.3 % (ref 38–73)
NRBC BLD-RTO: 0 /100 WBC
PLATELET # BLD AUTO: 246 K/UL (ref 150–350)
PMV BLD AUTO: 10.9 FL (ref 9.2–12.9)
POTASSIUM SERPL-SCNC: 4.4 MMOL/L (ref 3.5–5.1)
PROT SERPL-MCNC: 7 G/DL (ref 6–8.4)
RBC # BLD AUTO: 4.34 M/UL (ref 4.6–6.2)
SODIUM SERPL-SCNC: 137 MMOL/L (ref 136–145)
WBC # BLD AUTO: 11.67 K/UL (ref 3.9–12.7)

## 2020-10-04 PROCEDURE — 80053 COMPREHEN METABOLIC PANEL: CPT

## 2020-10-04 PROCEDURE — 43752 NASAL/OROGASTRIC W/TUBE PLMT: CPT

## 2020-10-04 PROCEDURE — 94770 HC EXHALED C02 TEST: CPT

## 2020-10-04 PROCEDURE — 20600001 HC STEP DOWN PRIVATE ROOM

## 2020-10-04 PROCEDURE — 25000003 PHARM REV CODE 250: Performed by: STUDENT IN AN ORGANIZED HEALTH CARE EDUCATION/TRAINING PROGRAM

## 2020-10-04 PROCEDURE — 99900035 HC TECH TIME PER 15 MIN (STAT)

## 2020-10-04 PROCEDURE — 63600175 PHARM REV CODE 636 W HCPCS: Performed by: STUDENT IN AN ORGANIZED HEALTH CARE EDUCATION/TRAINING PROGRAM

## 2020-10-04 PROCEDURE — 85025 COMPLETE CBC W/AUTO DIFF WBC: CPT

## 2020-10-04 PROCEDURE — S0028 INJECTION, FAMOTIDINE, 20 MG: HCPCS | Performed by: STUDENT IN AN ORGANIZED HEALTH CARE EDUCATION/TRAINING PROGRAM

## 2020-10-04 PROCEDURE — 94761 N-INVAS EAR/PLS OXIMETRY MLT: CPT

## 2020-10-04 PROCEDURE — 25000003 PHARM REV CODE 250: Performed by: SURGERY

## 2020-10-04 PROCEDURE — 86140 C-REACTIVE PROTEIN: CPT

## 2020-10-04 PROCEDURE — 36415 COLL VENOUS BLD VENIPUNCTURE: CPT

## 2020-10-04 RX ADMIN — ONDANSETRON 8 MG: 2 INJECTION INTRAMUSCULAR; INTRAVENOUS at 03:10

## 2020-10-04 RX ADMIN — ONDANSETRON 8 MG: 2 INJECTION INTRAMUSCULAR; INTRAVENOUS at 09:10

## 2020-10-04 RX ADMIN — FAMOTIDINE 20 MG: 10 INJECTION INTRAVENOUS at 08:10

## 2020-10-04 RX ADMIN — ACETAMINOPHEN 1000 MG: 500 TABLET ORAL at 02:10

## 2020-10-04 RX ADMIN — ACETAMINOPHEN 1000 MG: 500 TABLET ORAL at 09:10

## 2020-10-04 RX ADMIN — FAMOTIDINE 20 MG: 10 INJECTION INTRAVENOUS at 09:10

## 2020-10-04 RX ADMIN — PANTOPRAZOLE SODIUM 40 MG: 40 TABLET, DELAYED RELEASE ORAL at 08:10

## 2020-10-04 RX ADMIN — POTASSIUM CHLORIDE, DEXTROSE MONOHYDRATE AND SODIUM CHLORIDE: 150; 5; 450 INJECTION, SOLUTION INTRAVENOUS at 12:10

## 2020-10-04 RX ADMIN — GABAPENTIN 300 MG: 300 CAPSULE ORAL at 08:10

## 2020-10-04 RX ADMIN — ACETAMINOPHEN 1000 MG: 500 TABLET ORAL at 06:10

## 2020-10-04 RX ADMIN — ONDANSETRON 8 MG: 2 INJECTION INTRAMUSCULAR; INTRAVENOUS at 08:10

## 2020-10-04 RX ADMIN — GABAPENTIN 300 MG: 300 CAPSULE ORAL at 02:10

## 2020-10-04 RX ADMIN — MUPIROCIN: 20 OINTMENT TOPICAL at 09:10

## 2020-10-04 RX ADMIN — GABAPENTIN 300 MG: 300 CAPSULE ORAL at 09:10

## 2020-10-04 RX ADMIN — MUPIROCIN: 20 OINTMENT TOPICAL at 08:10

## 2020-10-04 RX ADMIN — ONDANSETRON 8 MG: 2 INJECTION INTRAMUSCULAR; INTRAVENOUS at 02:10

## 2020-10-04 RX ADMIN — METOPROLOL SUCCINATE 25 MG: 25 TABLET, EXTENDED RELEASE ORAL at 09:10

## 2020-10-04 NOTE — PLAN OF CARE
A&Ox4. VVS on RA, /77. Pain managed with PCA. NGT placed currently @ LIWS, tolerating well but wants removed. NPO status maintained.  Ambulated in room and up to chair. Adequate UOP per urinal. Pt currently resting comfortably, PCA button and call light in reach, bed in lowest position. WCTM.     Problem: Adult Inpatient Plan of Care  Goal: Plan of Care Review  Outcome: Ongoing, Progressing  Goal: Patient-Specific Goal (Individualization)  Outcome: Ongoing, Progressing  Goal: Absence of Hospital-Acquired Illness or Injury  Outcome: Ongoing, Progressing  Goal: Optimal Comfort and Wellbeing  Outcome: Ongoing, Progressing  Goal: Readiness for Transition of Care  Outcome: Ongoing, Progressing  Goal: Rounds/Family Conference  Outcome: Ongoing, Progressing     Problem: Fall Injury Risk  Goal: Absence of Fall and Fall-Related Injury  Outcome: Ongoing, Progressing     Problem: Infection  Goal: Infection Symptom Resolution  Outcome: Ongoing, Progressing     Problem: Skin Injury Risk Increased  Goal: Skin Health and Integrity  Outcome: Ongoing, Progressing

## 2020-10-04 NOTE — ASSESSMENT & PLAN NOTE
61yo M s/p ileocolic resection    - NPO  - mIVF  - NGT to LIWS  - Scheduled antiemetics + PRN  - PCA  - GI and DVT ppx  - OOB, ambulate

## 2020-10-04 NOTE — PROGRESS NOTES
[facility]  Colorectal Surgery  Progress Note    Patient Name: Daquan Jansen  MRN: [id number]  Admission Date: 10/2/2020  Hospital Length of Stay: 2 days  Attending Physician: DIAN Arreola MD    Subjective:     Interval History: NAEON. Pain better controlled on PCA. Denies n/v, NGT with 500cc dark brown output. -flatus/BM.    Post-Op Info:  Procedure(s) (LRB):  LAPAROSCOPIC ILEOCOLIC RESECTION (N/A)   2 Days Post-Op      Medications:  Continuous Infusions:   dextrose 5 % and 0.45 % NaCl with KCl 20 mEq 50 mL/hr at 10/03/20 1535    hydromorphone in 0.9 % NaCl 6 mg/30 ml       Scheduled Meds:   acetaminophen  1,000 mg Oral Q8H    famotidine (PF)  20 mg Intravenous BID    gabapentin  300 mg Oral TID    metoprolol succinate  25 mg Oral Daily    mupirocin   Nasal BID    ondansetron  8 mg Intravenous Q6H    pantoprazole  40 mg Oral Daily    scopolamine  1 patch Transdermal Q3 Days     PRN Meds:   naloxone    oxyCODONE    oxyCODONE    promethazine (PHENERGAN) IVPB    sodium chloride 0.9%    sodium chloride 0.9%    traMADoL        Objective:     Vital Signs (Most Recent):  Temp: 98.4 °F (36.9 °C) (10/04/20 0747)  Pulse: (!) 59 (10/04/20 0747)  Resp: 17 (10/04/20 0747)  BP: (!) 171/84 (10/04/20 0747)  SpO2: 95 % (10/04/20 0747) Vital Signs (24h Range):  Temp:  [97.9 °F (36.6 °C)-98.9 °F (37.2 °C)] 98.4 °F (36.9 °C)  Pulse:  [54-68] 59  Resp:  [14-18] 17  SpO2:  [93 %-99 %] 95 %  BP: (160-178)/(72-84) 171/84     Intake/Output - Last 3 Shifts       10/02 0700 - 10/03 0659 10/03 0700 - 10/04 0659 10/04 0700 - 10/05 0659    P.O. 720      I.V. (mL/kg) 2093 (29) 670.8 (9.3)     NG/GT   120    IV Piggyback 150 200     Total Intake(mL/kg) 2963 (41.1) 870.8 (12.1) 120 (1.7)    Urine (mL/kg/hr) 910 (0.5) 1900 (1.1) 600 (2.8)    Emesis/NG output  0     Drains  300 100    Other  0     Stool  0     Blood  0     Total Output 910 2200 700    Net +2053 -1329.2 -580           Urine Occurrence  2 x      Stool Occurrence  0 x     Emesis Occurrence  0 x           Physical Exam  Constitutional:       General: He is in acute distress.   HENT:      Head: Normocephalic.   Cardiovascular:      Rate and Rhythm: Normal rate.   Pulmonary:      Effort: Pulmonary effort is normal.   Abdominal:      General: There is distension.      Palpations: Abdomen is soft.      Tenderness: There is abdominal tenderness. There is no guarding.      Comments: Incisions c/d/i   Skin:     General: Skin is warm.   Neurological:      Mental Status: He is alert.     Significant Labs:  CBC (Last 3 Results):   Recent Labs   Lab 10/03/20  0606 10/04/20  0719   WBC 12.47 11.67   RBC 4.54* 4.34*   HGB 13.2* 12.5*   HCT 40.6 39.7*    246   MCV 89 92   MCH 29.1 28.8   MCHC 32.5 31.5*     CMP (Last 3 Results):   Recent Labs   Lab 10/03/20  0303 10/04/20  0719   * 120*   CALCIUM 9.3 8.7   ALBUMIN  --  3.5   PROT  --  7.0    137   K 4.1 4.4   CO2 17* 24    103   BUN 11 9   CREATININE 0.8 0.8   ALKPHOS  --  79   ALT  --  13   AST  --  19   BILITOT  --  0.6     Assessment/Plan:     * Stricture of small intestine  61yo M s/p ileocolic resection    - NPO  - mIVF  - NGT to LIWS  - Scheduled antiemetics + PRN  - PCA  - GI and DVT ppx  - OOB, ambulate          Letha Prather MD  Colorectal Surgery  [facility]

## 2020-10-04 NOTE — PLAN OF CARE
Plan of care reviewed with patient who demonstrated understanding. Pt. AAOx4, VSS on room air. BP running 170's systolic. Pt. Complains of pain that is being controlled with dilaudid PCA. NG tube intact with small amounts of brown output. Pt. Does not complain of nausea or vomiting. Pt. Voiding adequate amounts of clear yellow urine via urinal, no BM. IVF infusing per mar. No falls or injuries reported, bed in low position, bed rails x2, call light within reach frequent monitoring continued.

## 2020-10-04 NOTE — CARE UPDATE
Rapid Response Respiratory Therapy ETCO2 Check     Time of visit: 931    Code Status: No Order   : 1960  Bed: 1054/1054 A:   MRN: [id number]    SITUATION     Evaluated patient for: ETCO2 Compliance     BACKGROUND     Patient has a past medical history of Arthritis - hip, Chronic pain, Chronic, continuous use of opioids, Colon polyp, Crohn's disease, Food intolerance, Hyperlipemia, Restless leg syndrome, and Spinal stenosis.    ASSESSMENT     Is the ETCO2 monitor on? (Yes/No)  No   Is the patient wearing a cannula? (Yes/No) No  Are ETCO2 orders placed? (Yes/No) Yes  Is the patient on a PCA pump? (Yes/No) Yes  ETCO2 monitored: ETCO2 (mmHg): 37 mmHg  O2 Device/Concentration: RA  Pulse: 57 Respiratory rate: 14 Temperature: Temp: 98.4 °F (36.9 °C) BP: BP: (!) 171/84 SpO2: 94  Level of Consciousness: Level of Consciousness (AVPU): alert  All Lung Field Breath Sounds: All Lung Fields Breath Sounds: Anterior:, Lateral:, diminished  SERENA Breath Sounds: diminished  LLL Breath Sounds: diminished  RUL Breath Sounds: diminished  RML Breath Sounds: diminished  RLL Breath Sounds: diminished  Ambu at bedside: Ambu bag with the patient?: Yes, Adult Ambu    INTERVENTIONS/RECOMMENDATIONS     Continual monitoring of EtCO2 monitor to ensure device stays on and active. Continual monitoring of patient's oxygenation needs. Weaning of pain pump as tolerated.    PHYSICIAN ESCALATION     Physician Escalation (Yes/No): No     Care discussed with primary MILLIE NIEVES RRT     FOLLOW-UP     Please call back the Rapid Response RTReece, RRT at x [phone] for any questions or concerns.

## 2020-10-04 NOTE — SUBJECTIVE & OBJECTIVE
Subjective:     Interval History: NAEON. Pain better controlled on PCA. Denies n/v, NGT with 500cc dark brown output. -flatus/BM.    Post-Op Info:  Procedure(s) (LRB):  LAPAROSCOPIC ILEOCOLIC RESECTION (N/A)   2 Days Post-Op      Medications:  Continuous Infusions:   dextrose 5 % and 0.45 % NaCl with KCl 20 mEq 50 mL/hr at 10/03/20 1535    hydromorphone in 0.9 % NaCl 6 mg/30 ml       Scheduled Meds:   acetaminophen  1,000 mg Oral Q8H    famotidine (PF)  20 mg Intravenous BID    gabapentin  300 mg Oral TID    metoprolol succinate  25 mg Oral Daily    mupirocin   Nasal BID    ondansetron  8 mg Intravenous Q6H    pantoprazole  40 mg Oral Daily    scopolamine  1 patch Transdermal Q3 Days     PRN Meds:   naloxone    oxyCODONE    oxyCODONE    promethazine (PHENERGAN) IVPB    sodium chloride 0.9%    sodium chloride 0.9%    traMADoL        Objective:     Vital Signs (Most Recent):  Temp: 98.4 °F (36.9 °C) (10/04/20 0747)  Pulse: (!) 59 (10/04/20 0747)  Resp: 17 (10/04/20 0747)  BP: (!) 171/84 (10/04/20 0747)  SpO2: 95 % (10/04/20 0747) Vital Signs (24h Range):  Temp:  [97.9 °F (36.6 °C)-98.9 °F (37.2 °C)] 98.4 °F (36.9 °C)  Pulse:  [54-68] 59  Resp:  [14-18] 17  SpO2:  [93 %-99 %] 95 %  BP: (160-178)/(72-84) 171/84     Intake/Output - Last 3 Shifts       10/02 0700 - 10/03 0659 10/03 0700 - 10/04 0659 10/04 0700 - 10/05 0659    P.O. 720      I.V. (mL/kg) 2093 (29) 670.8 (9.3)     NG/GT   120    IV Piggyback 150 200     Total Intake(mL/kg) 2963 (41.1) 870.8 (12.1) 120 (1.7)    Urine (mL/kg/hr) 910 (0.5) 1900 (1.1) 600 (2.8)    Emesis/NG output  0     Drains  300 100    Other  0     Stool  0     Blood  0     Total Output 910 2200 700    Net +2053 -1329.2 -580           Urine Occurrence  2 x     Stool Occurrence  0 x     Emesis Occurrence  0 x           Physical Exam  Constitutional:       General: He is in acute distress.   HENT:      Head: Normocephalic.   Cardiovascular:      Rate and Rhythm: Normal rate.    Pulmonary:      Effort: Pulmonary effort is normal.   Abdominal:      General: There is distension.      Palpations: Abdomen is soft.      Tenderness: There is abdominal tenderness. There is no guarding.      Comments: Incisions c/d/i   Skin:     General: Skin is warm.   Neurological:      Mental Status: He is alert.     Significant Labs:  CBC (Last 3 Results):   Recent Labs   Lab 10/03/20  0606 10/04/20  0719   WBC 12.47 11.67   RBC 4.54* 4.34*   HGB 13.2* 12.5*   HCT 40.6 39.7*    246   MCV 89 92   MCH 29.1 28.8   MCHC 32.5 31.5*     CMP (Last 3 Results):   Recent Labs   Lab 10/03/20  0303 10/04/20  0719   * 120*   CALCIUM 9.3 8.7   ALBUMIN  --  3.5   PROT  --  7.0    137   K 4.1 4.4   CO2 17* 24    103   BUN 11 9   CREATININE 0.8 0.8   ALKPHOS  --  79   ALT  --  13   AST  --  19   BILITOT  --  0.6

## 2020-10-05 ENCOUNTER — TELEPHONE (OUTPATIENT)
Dept: SURGERY | Facility: CLINIC | Age: 60
End: 2020-10-05

## 2020-10-05 LAB
CRP SERPL-MCNC: 66.5 MG/L (ref 0–8.2)
CRP SERPL-MCNC: 66.5 MG/L (ref 0–8.2)

## 2020-10-05 PROCEDURE — 25000003 PHARM REV CODE 250: Performed by: STUDENT IN AN ORGANIZED HEALTH CARE EDUCATION/TRAINING PROGRAM

## 2020-10-05 PROCEDURE — S0028 INJECTION, FAMOTIDINE, 20 MG: HCPCS | Performed by: STUDENT IN AN ORGANIZED HEALTH CARE EDUCATION/TRAINING PROGRAM

## 2020-10-05 PROCEDURE — 86140 C-REACTIVE PROTEIN: CPT | Mod: 91

## 2020-10-05 PROCEDURE — 20600001 HC STEP DOWN PRIVATE ROOM

## 2020-10-05 PROCEDURE — 99900035 HC TECH TIME PER 15 MIN (STAT)

## 2020-10-05 PROCEDURE — 94760 N-INVAS EAR/PLS OXIMETRY 1: CPT

## 2020-10-05 PROCEDURE — 63600175 PHARM REV CODE 636 W HCPCS: Performed by: STUDENT IN AN ORGANIZED HEALTH CARE EDUCATION/TRAINING PROGRAM

## 2020-10-05 PROCEDURE — 86140 C-REACTIVE PROTEIN: CPT

## 2020-10-05 PROCEDURE — 94770 HC EXHALED C02 TEST: CPT

## 2020-10-05 PROCEDURE — 94761 N-INVAS EAR/PLS OXIMETRY MLT: CPT

## 2020-10-05 PROCEDURE — 25000003 PHARM REV CODE 250: Performed by: SURGERY

## 2020-10-05 RX ADMIN — ACETAMINOPHEN 1000 MG: 500 TABLET ORAL at 09:10

## 2020-10-05 RX ADMIN — ONDANSETRON 8 MG: 2 INJECTION INTRAMUSCULAR; INTRAVENOUS at 03:10

## 2020-10-05 RX ADMIN — GABAPENTIN 300 MG: 300 CAPSULE ORAL at 09:10

## 2020-10-05 RX ADMIN — POTASSIUM CHLORIDE, DEXTROSE MONOHYDRATE AND SODIUM CHLORIDE: 150; 5; 450 INJECTION, SOLUTION INTRAVENOUS at 09:10

## 2020-10-05 RX ADMIN — MUPIROCIN: 20 OINTMENT TOPICAL at 09:10

## 2020-10-05 RX ADMIN — ACETAMINOPHEN 1000 MG: 500 TABLET ORAL at 03:10

## 2020-10-05 RX ADMIN — ONDANSETRON 8 MG: 2 INJECTION INTRAMUSCULAR; INTRAVENOUS at 09:10

## 2020-10-05 RX ADMIN — PANTOPRAZOLE SODIUM 40 MG: 40 TABLET, DELAYED RELEASE ORAL at 09:10

## 2020-10-05 RX ADMIN — METOPROLOL SUCCINATE 25 MG: 25 TABLET, EXTENDED RELEASE ORAL at 09:10

## 2020-10-05 RX ADMIN — ACETAMINOPHEN 1000 MG: 500 TABLET ORAL at 06:10

## 2020-10-05 RX ADMIN — FAMOTIDINE 20 MG: 10 INJECTION INTRAVENOUS at 09:10

## 2020-10-05 RX ADMIN — GABAPENTIN 300 MG: 300 CAPSULE ORAL at 03:10

## 2020-10-05 NOTE — PLAN OF CARE
Plan of care reviewed with patient who demonstrated understanding. Pt. AAOx4, VSS on room air. Patient NPO with no complaints of nausea or vomiting. Pt. Complains of moderate pain that is controlled with minimal use of the dilaudid PCA. NG to low intermittent wall suction intact with scant output. Pt. Voiding adequately per urinal. Pt. Able to rest more comfortably, and for longer periods tonight. Bed in low position, bed rails x2, call light within reach, frequent monitoring continued.

## 2020-10-05 NOTE — SUBJECTIVE & OBJECTIVE
Subjective:     Interval History: no acute events overnite, NGT remains in place, denies flatus per rectum, no n/v    Post-Op Info:  Procedure(s) (LRB):  LAPAROSCOPIC ILEOCOLIC RESECTION (N/A)   3 Days Post-Op      Medications:  Continuous Infusions:   dextrose 5 % and 0.45 % NaCl with KCl 20 mEq 50 mL/hr at 10/04/20 1252    hydromorphone in 0.9 % NaCl 6 mg/30 ml       Scheduled Meds:   acetaminophen  1,000 mg Oral Q8H    famotidine (PF)  20 mg Intravenous BID    gabapentin  300 mg Oral TID    metoprolol succinate  25 mg Oral Daily    mupirocin   Nasal BID    ondansetron  8 mg Intravenous Q6H    pantoprazole  40 mg Oral Daily    scopolamine  1 patch Transdermal Q3 Days     PRN Meds:   naloxone    oxyCODONE    oxyCODONE    promethazine (PHENERGAN) IVPB    sodium chloride 0.9%    sodium chloride 0.9%    traMADoL        Objective:     Vital Signs (Most Recent):  Temp: 98.6 °F (37 °C) (10/05/20 0826)  Pulse: 60 (10/05/20 0900)  Resp: 16 (10/05/20 0900)  BP: (!) 158/84 (10/05/20 0826)  SpO2: 98 % (10/05/20 0900) Vital Signs (24h Range):  Temp:  [97.6 °F (36.4 °C)-98.8 °F (37.1 °C)] 98.6 °F (37 °C)  Pulse:  [60-69] 60  Resp:  [12-18] 16  SpO2:  [92 %-98 %] 98 %  BP: (152-171)/(80-88) 158/84     Intake/Output - Last 3 Shifts       10/03 0700 - 10/04 0659 10/04 0700 - 10/05 0659 10/05 0700 - 10/06 0659    P.O.       I.V. (mL/kg) 670.8 (9.3) 1150 (16)     NG/GT  120     IV Piggyback 200      Total Intake(mL/kg) 870.8 (12.1) 1270 (17.6)     Urine (mL/kg/hr) 1900 (1.1) 2375 (1.4) 240 (0.8)    Emesis/NG output 0 0     Drains 300 400     Other 0 0     Stool 0 0     Blood 0 0     Total Output 2200 2775 240    Net -1329.2 -1505 -240           Urine Occurrence 2 x 3 x     Stool Occurrence 0 x 0 x     Emesis Occurrence 0 x 0 x           Physical Exam  Vitals signs and nursing note reviewed.   Constitutional:       Appearance: He is well-developed. He is not ill-appearing.   Cardiovascular:      Rate and Rhythm:  Normal rate and regular rhythm.      Heart sounds: Normal heart sounds.   Pulmonary:      Effort: Pulmonary effort is normal. No respiratory distress.      Breath sounds: Normal breath sounds. No wheezing or rales.   Abdominal:      General: There is no distension.      Palpations: Abdomen is soft. There is no mass.      Tenderness: There is no abdominal tenderness. There is no guarding.      Comments: Inc line healing well  NGT in place with greenish drainage   Musculoskeletal: Normal range of motion.   Skin:     General: Skin is warm and dry.   Neurological:      Mental Status: He is alert and oriented to person, place, and time.   Psychiatric:         Behavior: Behavior normal.         Thought Content: Thought content normal.         Judgment: Judgment normal.           Significant Labs:  BMP (Last 3 Results):   Recent Labs   Lab 10/03/20  0303 10/04/20  0719   * 120*    137   K 4.1 4.4    103   CO2 17* 24   BUN 11 9   CREATININE 0.8 0.8   CALCIUM 9.3 8.7   MG 1.8  --      CBC (Last 3 Results):   Recent Labs   Lab 10/03/20  0606 10/04/20  0719   WBC 12.47 11.67   RBC 4.54* 4.34*   HGB 13.2* 12.5*   HCT 40.6 39.7*    246   MCV 89 92   MCH 29.1 28.8   MCHC 32.5 31.5*       Significant Diagnostics:  None

## 2020-10-05 NOTE — RESPIRATORY THERAPY
Rapid Response Respiratory Therapy ETCO2 Check     Time of visit: 723      Code Status: No Order   : 1960  Bed: 1054/1054 A:   MRN: [id number]    SITUATION     Evaluated patient for: ETCO2 Compliance     BACKGROUND     Patient has a past medical history of Arthritis - hip, Chronic pain, Chronic, continuous use of opioids, Colon polyp, Crohn's disease, Food intolerance, Hyperlipemia, Restless leg syndrome, and Spinal stenosis.    ASSESSMENT     Is the ETCO2 monitor on? (Yes/No)  yes   Is the patient wearing a cannula? (Yes/No) yes  Are ETCO2 orders placed? (Yes/No) yes  Is the patient on a PCA pump? (Yes/No) yes  ETCO2 monitored: ETCO2 (mmHg): 39 mmHg  O2 Device/Concentration:   Pulse:  Respiratory rate:  Temperature: Temp: 98.7 °F (37.1 °C) BP: BP: (!) 157/82 SpO2:   Level of Consciousness: Level of Consciousness (AVPU): alert  All Lung Field Breath Sounds: All Lung Fields Breath Sounds: clear  SERENA Breath Sounds: diminished  LLL Breath Sounds: diminished  RUL Breath Sounds: diminished  RML Breath Sounds: diminished  RLL Breath Sounds: diminished  Ambu at bedside: Ambu bag with the patient?: Yes, Adult Ambu    INTERVENTIONS/RECOMMENDATIONS     Pt was not wearing cannula and monitor was turned off.  I explained why it needed to be worn and he is now compliant and wearing    PHYSICIAN ESCALATION     Physician Escalation (Yes/No):      Care discussed with:   Discussed plan of care primary RT,      FOLLOW-UP     Please call back the Rapid Response RT, Sharlene Mead, RRT at x [phone] for any questions or concerns.

## 2020-10-05 NOTE — PLAN OF CARE
Admit Date:  10/2/2020  6:04 AM      Admit Diagnosis:  Crohn's disease of ileum with complication [K50.019]  Colonic stricture [K56.699]  Colonic stricture [K56.699]  Colonic stricture [K56.699]    CM met with patient in room for Discharge Planning Assessment. Per patient, he lives with his spouse in a house with 7 step(s) to enter.   Per patient, he was independent with ADLS and did not use DME for ambulation.  Patient will have assistance from family upon discharge.   Discharge Planning Booklet given to patient and discussed.  All questions addressed.  CM will follow for needs.     10/05/20 1239   Discharge Assessment   Assessment Type Discharge Planning Assessment   Confirmed/corrected address and phone number on facesheet? Yes   Assessment information obtained from? Patient   Expected Length of Stay (days) 6   Communicated expected length of stay with patient/caregiver yes   Prior to hospitilization cognitive status: Alert/Oriented   Prior to hospitalization functional status: Independent   Current cognitive status: Alert/Oriented   Current Functional Status: Independent   Lives With spouse   Able to Return to Prior Arrangements yes   Is patient able to care for self after discharge? Unable to determine at this time (comments)  (TBD)   Patient's perception of discharge disposition home or selfcare   Readmission Within the Last 30 Days no previous admission in last 30 days   Patient currently being followed by outpatient case management? No   Patient currently receives any other outside agency services? No   Equipment Currently Used at Home none   Do you have any problems affording any of your prescribed medications? No   Is the patient taking medications as prescribed? yes   Does the patient have transportation home? Yes   Transportation Anticipated family or friend will provide   Does the patient receive services at the [facility]? No   Discharge Plan A Home with family   Discharge Plan B Home Health   DME  Needed Upon Discharge  other (see comments)  (TBD)   Patient/Family in Agreement with Plan yes            PCP:  Brayan Serrano MD  None        Pharmacy:    [facility] [place] - [id number]  [id number] [place] [address]  Phone: [phone] Fax: [phone]        Emergency Contacts:  Extended Emergency Contact Information  Primary Emergency Contact: Melody Deleon  Mobile Phone: [phone]  Relation: Daughter  Preferred language: English   needed? No      Insurance:    Payor: [employer] MANAGED MEDICARE / Plan: [employer] MEDICARE HMO / Product Type: Capitation /       10/05/2020  12:42 PM    Kaley Yung RN, CM   Ext: [phone]

## 2020-10-05 NOTE — ASSESSMENT & PLAN NOTE
59yo M s/p ileocolic resection    - NPO  - mIVF  - NGT to LIWS  - Scheduled antiemetics + PRN  - PCA  - GI and DVT ppx  - OOB, ambulate

## 2020-10-05 NOTE — TELEPHONE ENCOUNTER
Retured call. No answer. Left message to ask charge nurse for needed information. If unable to obtain please call us back in the morning.

## 2020-10-05 NOTE — TELEPHONE ENCOUNTER
----- Message from Janny Cuadra sent at 10/5/2020  6:33 AM CDT -----  Pt   Lilliam would like to be called back regarding  PT care    can be reached at [phone]

## 2020-10-05 NOTE — PROGRESS NOTES
[facility]  Colorectal Surgery  Progress Note    Patient Name: Daquan Jansen  MRN: [id number]  Admission Date: 10/2/2020  Hospital Length of Stay: 3 days  Attending Physician: DIAN Arreola MD    Subjective:     Interval History: no acute events overnite, NGT remains in place, denies flatus per rectum, no n/v    Post-Op Info:  Procedure(s) (LRB):  LAPAROSCOPIC ILEOCOLIC RESECTION (N/A)   3 Days Post-Op      Medications:  Continuous Infusions:   dextrose 5 % and 0.45 % NaCl with KCl 20 mEq 50 mL/hr at 10/04/20 1252    hydromorphone in 0.9 % NaCl 6 mg/30 ml       Scheduled Meds:   acetaminophen  1,000 mg Oral Q8H    famotidine (PF)  20 mg Intravenous BID    gabapentin  300 mg Oral TID    metoprolol succinate  25 mg Oral Daily    mupirocin   Nasal BID    ondansetron  8 mg Intravenous Q6H    pantoprazole  40 mg Oral Daily    scopolamine  1 patch Transdermal Q3 Days     PRN Meds:   naloxone    oxyCODONE    oxyCODONE    promethazine (PHENERGAN) IVPB    sodium chloride 0.9%    sodium chloride 0.9%    traMADoL        Objective:     Vital Signs (Most Recent):  Temp: 98.6 °F (37 °C) (10/05/20 0826)  Pulse: 60 (10/05/20 0900)  Resp: 16 (10/05/20 0900)  BP: (!) 158/84 (10/05/20 0826)  SpO2: 98 % (10/05/20 0900) Vital Signs (24h Range):  Temp:  [97.6 °F (36.4 °C)-98.8 °F (37.1 °C)] 98.6 °F (37 °C)  Pulse:  [60-69] 60  Resp:  [12-18] 16  SpO2:  [92 %-98 %] 98 %  BP: (152-171)/(80-88) 158/84     Intake/Output - Last 3 Shifts       10/03 0700 - 10/04 0659 10/04 0700 - 10/05 0659 10/05 0700 - 10/06 0659    P.O.       I.V. (mL/kg) 670.8 (9.3) 1150 (16)     NG/GT  120     IV Piggyback 200      Total Intake(mL/kg) 870.8 (12.1) 1270 (17.6)     Urine (mL/kg/hr) 1900 (1.1) 2375 (1.4) 240 (0.8)    Emesis/NG output 0 0     Drains 300 400     Other 0 0     Stool 0 0     Blood 0 0     Total Output 2200 2775 240    Net -1329.2 -1505 -240           Urine Occurrence 2 x 3 x     Stool Occurrence 0 x 0 x      Emesis Occurrence 0 x 0 x           Physical Exam  Vitals signs and nursing note reviewed.   Constitutional:       Appearance: He is well-developed. He is not ill-appearing.   Cardiovascular:      Rate and Rhythm: Normal rate and regular rhythm.      Heart sounds: Normal heart sounds.   Pulmonary:      Effort: Pulmonary effort is normal. No respiratory distress.      Breath sounds: Normal breath sounds. No wheezing or rales.   Abdominal:      General: There is no distension.      Palpations: Abdomen is soft. There is no mass.      Tenderness: There is no abdominal tenderness. There is no guarding.      Comments: Inc line healing well  NGT in place with greenish drainage   Musculoskeletal: Normal range of motion.   Skin:     General: Skin is warm and dry.   Neurological:      Mental Status: He is alert and oriented to person, place, and time.   Psychiatric:         Behavior: Behavior normal.         Thought Content: Thought content normal.         Judgment: Judgment normal.           Significant Labs:  BMP (Last 3 Results):   Recent Labs   Lab 10/03/20  0303 10/04/20  0719   * 120*    137   K 4.1 4.4    103   CO2 17* 24   BUN 11 9   CREATININE 0.8 0.8   CALCIUM 9.3 8.7   MG 1.8  --      CBC (Last 3 Results):   Recent Labs   Lab 10/03/20  0606 10/04/20  0719   WBC 12.47 11.67   RBC 4.54* 4.34*   HGB 13.2* 12.5*   HCT 40.6 39.7*    246   MCV 89 92   MCH 29.1 28.8   MCHC 32.5 31.5*       Significant Diagnostics:  None    Assessment/Plan:     * Stricture of small intestine  59yo M s/p ileocolic resection    - NPO  - mIVF  - NGT to LIWS  - Scheduled antiemetics + PRN  - PCA  - GI and DVT ppx  - OOB, ambulate          Sandi Chang NP  Colorectal Surgery  [facility]

## 2020-10-05 NOTE — PT/OT/SLP PROGRESS
Occupational Therapy      Patient Name:  Daquan Jansen   MRN:  [id number]    Patient not seen today secondary to patient fatigue and requesting to defer therapy. Patient reported he just got back into bed after walking in the iqbal. Will follow-up next day as scheduled.    Tatum Mayen OT  10/5/2020

## 2020-10-06 LAB — CRP SERPL-MCNC: 41.6 MG/L (ref 0–8.2)

## 2020-10-06 PROCEDURE — 36415 COLL VENOUS BLD VENIPUNCTURE: CPT

## 2020-10-06 PROCEDURE — 63600175 PHARM REV CODE 636 W HCPCS: Performed by: NURSE PRACTITIONER

## 2020-10-06 PROCEDURE — 97116 GAIT TRAINING THERAPY: CPT

## 2020-10-06 PROCEDURE — 25000003 PHARM REV CODE 250: Performed by: STUDENT IN AN ORGANIZED HEALTH CARE EDUCATION/TRAINING PROGRAM

## 2020-10-06 PROCEDURE — 20600001 HC STEP DOWN PRIVATE ROOM

## 2020-10-06 PROCEDURE — 25000003 PHARM REV CODE 250: Performed by: SURGERY

## 2020-10-06 PROCEDURE — S0028 INJECTION, FAMOTIDINE, 20 MG: HCPCS | Performed by: STUDENT IN AN ORGANIZED HEALTH CARE EDUCATION/TRAINING PROGRAM

## 2020-10-06 PROCEDURE — 63600175 PHARM REV CODE 636 W HCPCS: Performed by: SURGERY

## 2020-10-06 PROCEDURE — 94761 N-INVAS EAR/PLS OXIMETRY MLT: CPT

## 2020-10-06 PROCEDURE — 86140 C-REACTIVE PROTEIN: CPT

## 2020-10-06 PROCEDURE — 99900035 HC TECH TIME PER 15 MIN (STAT)

## 2020-10-06 PROCEDURE — 63600175 PHARM REV CODE 636 W HCPCS: Performed by: STUDENT IN AN ORGANIZED HEALTH CARE EDUCATION/TRAINING PROGRAM

## 2020-10-06 PROCEDURE — 94770 HC EXHALED C02 TEST: CPT

## 2020-10-06 RX ORDER — ENOXAPARIN SODIUM 100 MG/ML
40 INJECTION SUBCUTANEOUS EVERY 24 HOURS
Status: DISCONTINUED | OUTPATIENT
Start: 2020-10-06 | End: 2020-10-07 | Stop reason: HOSPADM

## 2020-10-06 RX ORDER — HYDROMORPHONE HYDROCHLORIDE 1 MG/ML
0.5 INJECTION, SOLUTION INTRAMUSCULAR; INTRAVENOUS; SUBCUTANEOUS EVERY 6 HOURS PRN
Status: DISCONTINUED | OUTPATIENT
Start: 2020-10-06 | End: 2020-10-07 | Stop reason: HOSPADM

## 2020-10-06 RX ADMIN — POTASSIUM CHLORIDE, DEXTROSE MONOHYDRATE AND SODIUM CHLORIDE: 150; 5; 450 INJECTION, SOLUTION INTRAVENOUS at 09:10

## 2020-10-06 RX ADMIN — ONDANSETRON 8 MG: 2 INJECTION INTRAMUSCULAR; INTRAVENOUS at 03:10

## 2020-10-06 RX ADMIN — Medication: at 04:10

## 2020-10-06 RX ADMIN — ACETAMINOPHEN 1000 MG: 500 TABLET ORAL at 03:10

## 2020-10-06 RX ADMIN — METOPROLOL SUCCINATE 25 MG: 25 TABLET, EXTENDED RELEASE ORAL at 09:10

## 2020-10-06 RX ADMIN — ONDANSETRON 8 MG: 2 INJECTION INTRAMUSCULAR; INTRAVENOUS at 04:10

## 2020-10-06 RX ADMIN — FAMOTIDINE 20 MG: 10 INJECTION INTRAVENOUS at 09:10

## 2020-10-06 RX ADMIN — ENOXAPARIN SODIUM 40 MG: 40 INJECTION SUBCUTANEOUS at 05:10

## 2020-10-06 RX ADMIN — SCOPALAMINE 1 PATCH: 1 PATCH, EXTENDED RELEASE TRANSDERMAL at 09:10

## 2020-10-06 RX ADMIN — ONDANSETRON 8 MG: 2 INJECTION INTRAMUSCULAR; INTRAVENOUS at 09:10

## 2020-10-06 RX ADMIN — GABAPENTIN 300 MG: 300 CAPSULE ORAL at 09:10

## 2020-10-06 RX ADMIN — MUPIROCIN: 20 OINTMENT TOPICAL at 09:10

## 2020-10-06 RX ADMIN — HYDROMORPHONE HYDROCHLORIDE 0.5 MG: 1 INJECTION, SOLUTION INTRAMUSCULAR; INTRAVENOUS; SUBCUTANEOUS at 06:10

## 2020-10-06 RX ADMIN — GABAPENTIN 300 MG: 300 CAPSULE ORAL at 03:10

## 2020-10-06 RX ADMIN — ACETAMINOPHEN 1000 MG: 500 TABLET ORAL at 05:10

## 2020-10-06 RX ADMIN — OXYCODONE HYDROCHLORIDE 10 MG: 10 TABLET ORAL at 09:10

## 2020-10-06 RX ADMIN — PANTOPRAZOLE SODIUM 40 MG: 40 TABLET, DELAYED RELEASE ORAL at 09:10

## 2020-10-06 NOTE — SUBJECTIVE & OBJECTIVE
Subjective:     Interval History: no acute events overnite, still denies flatus, no n/v, d/c pca    Post-Op Info:  Procedure(s) (LRB):  LAPAROSCOPIC ILEOCOLIC RESECTION (N/A)   4 Days Post-Op      Medications:  Continuous Infusions:   dextrose 5 % and 0.45 % NaCl with KCl 20 mEq 50 mL/hr at 10/06/20 0912     Scheduled Meds:   acetaminophen  1,000 mg Oral Q8H    enoxaparin  40 mg Subcutaneous Q24H    famotidine (PF)  20 mg Intravenous BID    gabapentin  300 mg Oral TID    metoprolol succinate  25 mg Oral Daily    mupirocin   Nasal BID    ondansetron  8 mg Intravenous Q6H    pantoprazole  40 mg Oral Daily    scopolamine  1 patch Transdermal Q3 Days     PRN Meds:   HYDROmorphone    oxyCODONE    oxyCODONE    promethazine (PHENERGAN) IVPB    sodium chloride 0.9%    sodium chloride 0.9%    traMADoL        Objective:     Vital Signs (Most Recent):  Temp: 96.1 °F (35.6 °C) (10/06/20 0806)  Pulse: 60 (10/06/20 0806)  Resp: 15 (10/06/20 0806)  BP: 132/71 (10/06/20 0806)  SpO2: 97 % (10/06/20 0806) Vital Signs (24h Range):  Temp:  [96.1 °F (35.6 °C)-99.4 °F (37.4 °C)] 96.1 °F (35.6 °C)  Pulse:  [53-67] 60  Resp:  [12-20] 15  SpO2:  [95 %-97 %] 97 %  BP: (125-149)/(65-80) 132/71     Intake/Output - Last 3 Shifts       10/04 0700 - 10/05 0659 10/05 0700 - 10/06 0659 10/06 0700 - 10/07 0659    P.O.  360     I.V. (mL/kg) 1150 (16) 200 (2.8)     NG/      IV Piggyback       Total Intake(mL/kg) 1270 (17.6) 560 (7.8)     Urine (mL/kg/hr) 2375 (1.4) 1300 (0.8)     Emesis/NG output 0 0     Drains 400 150     Other 0 0     Stool 0 0     Blood 0 0     Total Output 2775 1450     Net -1505 -890            Urine Occurrence 3 x 1 x     Stool Occurrence 0 x 0 x     Emesis Occurrence 0 x 0 x           Physical Exam  Vitals signs and nursing note reviewed.   Constitutional:       Appearance: He is well-developed. He is not ill-appearing.   Cardiovascular:      Rate and Rhythm: Normal rate and regular rhythm.      Heart  sounds: Normal heart sounds.   Pulmonary:      Effort: Pulmonary effort is normal. No respiratory distress.      Breath sounds: Normal breath sounds. No wheezing or rales.   Abdominal:      General: There is distension.      Palpations: Abdomen is soft. There is no mass.      Tenderness: There is no abdominal tenderness. There is no guarding.      Comments: Inc line healing well   Musculoskeletal: Normal range of motion.   Skin:     General: Skin is warm and dry.   Neurological:      Mental Status: He is alert and oriented to person, place, and time.   Psychiatric:         Behavior: Behavior normal.         Thought Content: Thought content normal.         Judgment: Judgment normal.           Significant Labs:  BMP (Last 3 Results):   Recent Labs   Lab 10/03/20  0303 10/04/20  0719   * 120*    137   K 4.1 4.4    103   CO2 17* 24   BUN 11 9   CREATININE 0.8 0.8   CALCIUM 9.3 8.7   MG 1.8  --      CBC (Last 3 Results):   Recent Labs   Lab 10/03/20  0606 10/04/20  0719   WBC 12.47 11.67   RBC 4.54* 4.34*   HGB 13.2* 12.5*   HCT 40.6 39.7*    246   MCV 89 92   MCH 29.1 28.8   MCHC 32.5 31.5*       Significant Diagnostics:  None

## 2020-10-06 NOTE — PLAN OF CARE
Problem: Physical Therapy Goal  Goal: Physical Therapy Goal  Description: Goals to be met by: 10/13/20     Patient will increase functional independence with mobility by performin. Supine to sit with Modified Ponce  2. Sit to supine with Modified Ponce  3. Sit to stand transfer with Modified Ponce  4. Gait  x 200 feet with Modified Ponce using LRAD, if needed.   5. Ascend/descend 4 stairs with Modified Ponce    Outcome: Met   Met all goals outlined in POC, does note require additional acute PT services.  Pt safe to d/c from PT stand point.  D/C note to follow.    Foster Lester, PT,DPT  10/6/2020

## 2020-10-06 NOTE — PT/OT/SLP PROGRESS
"Physical Therapy Treatment  And Discharge    Patient Name:  Daquan Jansen   MRN:  [id number]    Recommendations:     Discharge Recommendations:  home   Discharge Equipment Recommendations: none   Barriers to discharge: None    Assessment:     Daquan Jansen is a 60 y.o. male admitted with a medical diagnosis of Stricture of small intestine.  He presents with the following impairments/functional limitations:   .  Tolerated session with c/o fatigue.  Pt met all goals outlined in POC and does not require additional acute PT services at this time.  Pt performing mobility with independence - supervision.  Pt able to amb household distance without AD and demo steady gait and no major deficits.  Pt also able to safely navigate stairs with steady gait.  Pt safe to d/c home with no additional PT services needed.    Rehab Prognosis: Good; patient would benefit from acute skilled PT services to address these deficits and reach maximum level of function.    Recent Surgery: Procedure(s) (LRB):  LAPAROSCOPIC ILEOCOLIC RESECTION (N/A) 4 Days Post-Op    Plan:     During this hospitalization, patient to be seen 3 x/week to address the identified rehab impairments via gait training, therapeutic activities, therapeutic exercises, neuromuscular re-education and progress toward the following goals:    · Plan of Care Expires:  11/02/20    Subjective     Chief Complaint: fatigue  Patient/Family Comments/goals: "I was napping"  Pain/Comfort:  · Pain Rating 1: 0/10      Objective:     Communicated with RN prior to session.  Patient found HOB elevated with peripheral IV, telemetry upon PT entry to room.     General Precautions: Standard, fall   Orthopedic Precautions:N/A   Braces: N/A     Functional Mobility:  · Bed Mobility:     · Rolling Left:  independence  · Scooting: independence  · Supine to Sit: independence  · Sit to Supine: independence  · Transfers:     · Sit to Stand:  independence with no AD  · Gait: 150ft with no AD (S) "   · demo steady gait and no major deficits  · Balance: standing (S)  · Stairs:  Pt ascended/descended 3 stair(s) with No Assistive Device with no handrails with Supervision or Set-up Assistance.   · Reciprocal gait pattern, steady      AM-PAC 6 CLICK MOBILITY  Turning over in bed (including adjusting bedclothes, sheets and blankets)?: 4  Sitting down on and standing up from a chair with arms (e.g., wheelchair, bedside commode, etc.): 3  Moving from lying on back to sitting on the side of the bed?: 4  Moving to and from a bed to a chair (including a wheelchair)?: 3  Need to walk in hospital room?: 3  Climbing 3-5 steps with a railing?: 3  Basic Mobility Total Score: 20       Therapeutic Activities and Exercises:  Pt educated on: PT role/POC; safety c mobility; benefits of OOB activities; performing therex; d/c recs - v/u      Patient left HOB elevated with all lines intact, call button in reach and RN notified..    GOALS:   Multidisciplinary Problems     Physical Therapy Goals     Not on file          Multidisciplinary Problems (Resolved)        Problem: Physical Therapy Goal    Goal Priority Disciplines Outcome Goal Variances Interventions   Physical Therapy Goal   (Resolved)     PT, PT/OT Met     Description: Goals to be met by: 10/13/20     Patient will increase functional independence with mobility by performin. Supine to sit with Modified [place]  2. Sit to supine with Modified [place]  3. Sit to stand transfer with Modified [place]  4. Gait  x 200 feet with Modified [place] using LRAD, if needed.   5. Ascend/descend 4 stairs with Modified [place]                     Time Tracking:     PT Received On: 10/06/20  PT Start Time: 1058     PT Stop Time: 1107  PT Total Time (min): 9 min     Billable Minutes: Gait Training 9 min    Treatment Type: Treatment  PT/PTA: PT     PTA Visit Number: 0     Foster Lester, PT  10/06/2020

## 2020-10-06 NOTE — PLAN OF CARE
Plan of care reviewed with patient. Pt. AAOx4, VSS on room air. Patient on low fiber/low residue diet, tolerating diet. Discontinued the PCA pump. Hasn't complained of pain since then. Pt. Voiding adequately per urinal. Had one  BM today Pt. Ambulated in the hallways. Bed in low position, bed rails x2, call light within reach.

## 2020-10-06 NOTE — PLAN OF CARE
Plan of care reviewed with patient who demonstrated understanding. Pt. AAOx4, VSS on room air. Patient tolerating clear liquid diet with no complaints of nausea or vomiting. Pt. does not complain of pain over a 2. Pt. Voiding adequately per urinal. Pt. Able to rest more comfortably, and for longer periods tonight. Bed in low position, bed rails x2, call light within reach, frequent monitoring continued.

## 2020-10-06 NOTE — PLAN OF CARE
Plan of care reviewed with patient. Pt. AAOx4, VSS on room air. Patient NPO with no complaints of nausea or vomiting. Pain managed with PCA pump, minimal use. Discontinued NG per order. Pt. Voiding adequately per urinal. Pt. Ambulated in the hallways. Bed in low position, bed rails x2, call light within reach.

## 2020-10-06 NOTE — PROGRESS NOTES
[facility]  Colorectal Surgery  Progress Note    Patient Name: Daquan Jansen  MRN: [id number]  Admission Date: 10/2/2020  Hospital Length of Stay: 4 days  Attending Physician: DIAN Arreola MD    Subjective:     Interval History: no acute events overnite, still denies flatus, no n/v, d/c pca    Post-Op Info:  Procedure(s) (LRB):  LAPAROSCOPIC ILEOCOLIC RESECTION (N/A)   4 Days Post-Op      Medications:  Continuous Infusions:   dextrose 5 % and 0.45 % NaCl with KCl 20 mEq 50 mL/hr at 10/06/20 0912     Scheduled Meds:   acetaminophen  1,000 mg Oral Q8H    enoxaparin  40 mg Subcutaneous Q24H    famotidine (PF)  20 mg Intravenous BID    gabapentin  300 mg Oral TID    metoprolol succinate  25 mg Oral Daily    mupirocin   Nasal BID    ondansetron  8 mg Intravenous Q6H    pantoprazole  40 mg Oral Daily    scopolamine  1 patch Transdermal Q3 Days     PRN Meds:   HYDROmorphone    oxyCODONE    oxyCODONE    promethazine (PHENERGAN) IVPB    sodium chloride 0.9%    sodium chloride 0.9%    traMADoL        Objective:     Vital Signs (Most Recent):  Temp: 96.1 °F (35.6 °C) (10/06/20 0806)  Pulse: 60 (10/06/20 0806)  Resp: 15 (10/06/20 0806)  BP: 132/71 (10/06/20 0806)  SpO2: 97 % (10/06/20 0806) Vital Signs (24h Range):  Temp:  [96.1 °F (35.6 °C)-99.4 °F (37.4 °C)] 96.1 °F (35.6 °C)  Pulse:  [53-67] 60  Resp:  [12-20] 15  SpO2:  [95 %-97 %] 97 %  BP: (125-149)/(65-80) 132/71     Intake/Output - Last 3 Shifts       10/04 0700 - 10/05 0659 10/05 0700 - 10/06 0659 10/06 0700 - 10/07 0659    P.O.  360     I.V. (mL/kg) 1150 (16) 200 (2.8)     NG/      IV Piggyback       Total Intake(mL/kg) 1270 (17.6) 560 (7.8)     Urine (mL/kg/hr) 2375 (1.4) 1300 (0.8)     Emesis/NG output 0 0     Drains 400 150     Other 0 0     Stool 0 0     Blood 0 0     Total Output 2775 1450     Net -1504 -890            Urine Occurrence 3 x 1 x     Stool Occurrence 0 x 0 x     Emesis Occurrence 0 x 0 x            Physical Exam  Vitals signs and nursing note reviewed.   Constitutional:       Appearance: He is well-developed. He is not ill-appearing.   Cardiovascular:      Rate and Rhythm: Normal rate and regular rhythm.      Heart sounds: Normal heart sounds.   Pulmonary:      Effort: Pulmonary effort is normal. No respiratory distress.      Breath sounds: Normal breath sounds. No wheezing or rales.   Abdominal:      General: There is distension.      Palpations: Abdomen is soft. There is no mass.      Tenderness: There is no abdominal tenderness. There is no guarding.      Comments: Inc line healing well   Musculoskeletal: Normal range of motion.   Skin:     General: Skin is warm and dry.   Neurological:      Mental Status: He is alert and oriented to person, place, and time.   Psychiatric:         Behavior: Behavior normal.         Thought Content: Thought content normal.         Judgment: Judgment normal.           Significant Labs:  BMP (Last 3 Results):   Recent Labs   Lab 10/03/20  0303 10/04/20  0719   * 120*    137   K 4.1 4.4    103   CO2 17* 24   BUN 11 9   CREATININE 0.8 0.8   CALCIUM 9.3 8.7   MG 1.8  --      CBC (Last 3 Results):   Recent Labs   Lab 10/03/20  0606 10/04/20  0719   WBC 12.47 11.67   RBC 4.54* 4.34*   HGB 13.2* 12.5*   HCT 40.6 39.7*    246   MCV 89 92   MCH 29.1 28.8   MCHC 32.5 31.5*       Significant Diagnostics:  None    Assessment/Plan:     * Stricture of small intestine  59yo M s/p ileocolic resection    - lfd- mIVF  - NGT dced 10/5  - Scheduled antiemetics + PRN  - PCA dced  - GI and DVT ppx  - OOB, ambulate          Sandi Chang NP  Colorectal Surgery  [facility]

## 2020-10-06 NOTE — ASSESSMENT & PLAN NOTE
61yo M s/p ileocolic resection    - lfd- mIVF  - NGT dced 10/5  - Scheduled antiemetics + PRN  - PCA dced  - GI and DVT ppx  - OOB, ambulate

## 2020-10-06 NOTE — ANESTHESIA POSTPROCEDURE EVALUATION
Anesthesia Post Evaluation    Patient: Daquan Jansen    Procedure(s) Performed: Procedure(s) (LRB):  LAPAROSCOPIC ILEOCOLIC RESECTION (N/A)    Final Anesthesia Type: general    Patient location during evaluation: PACU  Patient participation: Yes- Able to Participate  Level of consciousness: awake and alert and oriented  Post-procedure vital signs: reviewed and stable  Pain management: adequate  Airway patency: patent    PONV status at discharge: No PONV  Anesthetic complications: no      Cardiovascular status: blood pressure returned to baseline and hemodynamically stable  Respiratory status: unassisted and spontaneous ventilation  Hydration status: euvolemic  Follow-up not needed.          Vitals Value Taken Time   /71 10/06/20 0806   Temp 35.6 °C (96.1 °F) 10/06/20 0806   Pulse 60 10/06/20 0806   Resp 15 10/06/20 0806   SpO2 97 % 10/06/20 0806         Event Time   Out of Recovery 15:00:00         Pain/Donya Score: Pain Rating Prior to Med Admin: 1 (10/6/2020  6:47 AM)  Donya Score: 8 (10/5/2020  7:23 AM)

## 2020-10-07 ENCOUNTER — PATIENT MESSAGE (OUTPATIENT)
Dept: SURGERY | Facility: CLINIC | Age: 60
End: 2020-10-07

## 2020-10-07 VITALS
HEIGHT: 69 IN | TEMPERATURE: 98 F | DIASTOLIC BLOOD PRESSURE: 63 MMHG | HEART RATE: 65 BPM | WEIGHT: 159 LBS | SYSTOLIC BLOOD PRESSURE: 108 MMHG | OXYGEN SATURATION: 98 % | RESPIRATION RATE: 17 BRPM | BODY MASS INDEX: 23.55 KG/M2

## 2020-10-07 LAB — CRP SERPL-MCNC: 28.9 MG/L (ref 0–8.2)

## 2020-10-07 PROCEDURE — 86140 C-REACTIVE PROTEIN: CPT | Mod: 91

## 2020-10-07 PROCEDURE — 25000003 PHARM REV CODE 250: Performed by: STUDENT IN AN ORGANIZED HEALTH CARE EDUCATION/TRAINING PROGRAM

## 2020-10-07 PROCEDURE — 63600175 PHARM REV CODE 636 W HCPCS: Performed by: STUDENT IN AN ORGANIZED HEALTH CARE EDUCATION/TRAINING PROGRAM

## 2020-10-07 PROCEDURE — 25000003 PHARM REV CODE 250: Performed by: SURGERY

## 2020-10-07 RX ORDER — OXYCODONE HYDROCHLORIDE 10 MG/1
10 TABLET ORAL EVERY 4 HOURS PRN
Qty: 30 TABLET | Refills: 0 | Status: SHIPPED | OUTPATIENT
Start: 2020-10-07 | End: 2020-10-07

## 2020-10-07 RX ORDER — ONDANSETRON 4 MG/1
8 TABLET, ORALLY DISINTEGRATING ORAL EVERY 6 HOURS PRN
Qty: 30 TABLET | Refills: 0 | Status: SHIPPED | OUTPATIENT
Start: 2020-10-07 | End: 2020-10-21

## 2020-10-07 RX ORDER — ONDANSETRON HYDROCHLORIDE 8 MG/1
8 TABLET, FILM COATED ORAL EVERY 8 HOURS PRN
Qty: 30 TABLET | Refills: 1 | Status: SHIPPED | OUTPATIENT
Start: 2020-10-07 | End: 2020-10-07 | Stop reason: SDUPTHER

## 2020-10-07 RX ORDER — OXYCODONE HYDROCHLORIDE 5 MG/1
5 TABLET ORAL EVERY 4 HOURS PRN
Qty: 30 TABLET | Refills: 0 | Status: SHIPPED | OUTPATIENT
Start: 2020-10-07 | End: 2020-10-21

## 2020-10-07 RX ORDER — ONDANSETRON HYDROCHLORIDE 8 MG/1
8 TABLET, FILM COATED ORAL EVERY 8 HOURS PRN
Qty: 30 TABLET | Refills: 1 | Status: SHIPPED | OUTPATIENT
Start: 2020-10-07 | End: 2020-10-21

## 2020-10-07 RX ORDER — OXYCODONE HYDROCHLORIDE 10 MG/1
10 TABLET ORAL EVERY 4 HOURS PRN
Qty: 30 TABLET | Refills: 0 | Status: SHIPPED | OUTPATIENT
Start: 2020-10-07 | End: 2020-10-07 | Stop reason: SDUPTHER

## 2020-10-07 RX ADMIN — ONDANSETRON 8 MG: 2 INJECTION INTRAMUSCULAR; INTRAVENOUS at 09:10

## 2020-10-07 RX ADMIN — ONDANSETRON 8 MG: 2 INJECTION INTRAMUSCULAR; INTRAVENOUS at 03:10

## 2020-10-07 RX ADMIN — METOPROLOL SUCCINATE 25 MG: 25 TABLET, EXTENDED RELEASE ORAL at 09:10

## 2020-10-07 RX ADMIN — OXYCODONE HYDROCHLORIDE 10 MG: 10 TABLET ORAL at 02:10

## 2020-10-07 RX ADMIN — MUPIROCIN: 20 OINTMENT TOPICAL at 09:10

## 2020-10-07 RX ADMIN — PANTOPRAZOLE SODIUM 40 MG: 40 TABLET, DELAYED RELEASE ORAL at 09:10

## 2020-10-07 RX ADMIN — ACETAMINOPHEN 1000 MG: 500 TABLET ORAL at 05:10

## 2020-10-07 RX ADMIN — GABAPENTIN 300 MG: 300 CAPSULE ORAL at 09:10

## 2020-10-07 NOTE — HPI
60-year-old male with history of Crohn's disease, ileal distribution with fibrosis stenosing phenotype.  He has developed an intractable stricture.  It has been recommended by Gastroenterology that he proceed to resection.  The details of the procedure, functional consequences, expected recuperation and hospitalization, and the potential risks of surgery have been discussed in detail.

## 2020-10-07 NOTE — DISCHARGE SUMMARY
[facility]  Colorectal Surgery  Discharge Summary      Patient Name: Daquan Jansen  MRN: [id number]  Admission Date: 10/2/2020  Hospital Length of Stay: 5 days  Discharge Date and Time: 10/7/2020 11:52 AM  Attending Physician: No att. providers found   Discharging Provider: Sandi Chang NP  Primary Care Provider: Brayan Serrano MD     HPI:  60-year-old male with history of Crohn's disease, ileal distribution with fibrosis stenosing phenotype.  He has developed an intractable stricture.  It has been recommended by Gastroenterology that he proceed to resection.  The details of the procedure, functional consequences, expected recuperation and hospitalization, and the potential risks of surgery have been discussed in detail.    Procedure(s) (LRB):  LAPAROSCOPIC ILEOCOLIC RESECTION (N/A)     Hospital Course:  Pt underwent the above procedure.  NGT left in place post op, PCA was needed for pain control.  Once bowel function resumed NGT was removed and diet was slowly advanced.  Once able to tolerate solid food PCA stopped and oral pain meds given.  On day of discharge pt is brien regular diet, inc line healing well, positive for bm with flatus, ambulating in iqbal without difficulty, adequate pain control with oral medication, VS stable and afebrile.    FU 2 weeks Dr. Perez         Significant Diagnostic Studies: Labs: BMP: No results for input(s): GLU, NA, K, CL, CO2, BUN, CREATININE, CALCIUM, MG in the last 48 hours. and CBC No results for input(s): WBC, HGB, HCT, PLT in the last 48 hours.    Pending Diagnostic Studies:     Procedure Component Value Units Date/Time    C-Reactive Protein [[id number]] Collected: 10/06/20 0310    Order Status: Sent Lab Status: In process Updated: 10/06/20 0310    Specimen: Blood     Specimen to Pathology, Surgery Other (colon/rectal) [[id number]] Collected: 10/02/20 1339    Order Status: Sent Lab Status: In process Updated: 10/05/20 0912        Final Active  Diagnoses:    Diagnosis Date Noted POA    PRINCIPAL PROBLEM:  Stricture of small intestine [K56.699] 10/02/2020 Yes      Problems Resolved During this Admission:      Discharged Condition: good    Disposition: Home or Self Care    Follow Up:  Follow-up Information     H Jorge Alberto Arreola MD On 10/21/2020.    Specialty: Colon and Rectal Surgery  Why: post op appointment at 10 AM   Contact information:  [address]  [facility] [address]  [phone]                 Patient Instructions:      Lifting restrictions   Order Comments: No lifting anything greater than 5 pounds     Call MD for:  persistent nausea and vomiting or diarrhea     Call MD for:  severe uncontrolled pain     Call MD for:  redness, tenderness, or signs of infection (pain, swelling, redness, odor or green/yellow discharge around incision site)     Call MD for:  difficulty breathing or increased cough     Call MD for:  severe persistent headache     Call MD for:  worsening rash     Call MD for:  persistent dizziness, light-headedness, or visual disturbances     Call MD for:  increased confusion or weakness     Call MD for:   Order Comments: Call for temp above 101     Medications:  Reconciled Home Medications:      Medication List      START taking these medications    ondansetron 8 MG tablet  Commonly known as: ZOFRAN  Take 1 tablet (8 mg total) by mouth every 8 (eight) hours as needed for Nausea.     oxyCODONE 10 mg Tab immediate release tablet  Commonly known as: ROXICODONE  Take 1 tablet (10 mg total) by mouth every 4 (four) hours as needed.        CONTINUE taking these medications    atorvastatin 40 MG tablet  Commonly known as: LIPITOR  Take 40 mg by mouth once daily.     buprenorphine 10 mcg/hour Ptwk  Commonly known as: BUTRANS     coenzyme Q10 200 mg capsule  Take 200 mg by mouth once daily.     metoprolol succinate 25 MG 24 hr tablet  Commonly known as: TOPROL-XL  Take 25 mg by mouth once daily.     REMICADE IV  Inject into the vein.      rosuvastatin 20 MG tablet  Commonly known as: CRESTOR  Take 20 mg by mouth once daily.     UNABLE TO FIND  Smart[employer] Pants Men's formula     VITAMIN D3 50 mcg (2,000 unit) Cap  Generic drug: cholecalciferol (vitamin D3)  Take 1 capsule by mouth once daily.        STOP taking these medications    HYDROcodone-acetaminophen  mg per tablet  Commonly known as: NORCO     metroNIDAZOLE 500 MG tablet  Commonly known as: FLAGYL     neomycin 500 mg Tab  Commonly known as: MYCIFRADIN     polyethylene glycol 17 gram/dose powder  Commonly known as: GLYCOLAX     predniSONE 10 MG tablet  Commonly known as: DELTASONE            Sandi Chang NP  Colorectal Surgery  [facility]

## 2020-10-07 NOTE — ASSESSMENT & PLAN NOTE
59yo M s/p ileocolic resection    - lfd- mIVF  - NGT dced 10/5  - Scheduled antiemetics + PRN  - PCA dced  - GI and DVT ppx  - OOB, ambulate

## 2020-10-07 NOTE — HOSPITAL COURSE
Pt underwent the above procedure.  NGT left in place post op, PCA was needed for pain control.  Once bowel function resumed NGT was removed and diet was slowly advanced.  Once able to tolerate solid food PCA stopped and oral pain meds given.  On day of discharge pt is brien regular diet, inc line healing well, positive for bm with flatus, ambulating in iqbal without difficulty, adequate pain control with oral medication, VS stable and afebrile.    FU 2 weeks Dr. Perez

## 2020-10-07 NOTE — PLAN OF CARE
Patient will be discharged home with no needs      10/07/20 1009   Final Note   Assessment Type Final Discharge Note   Anticipated Discharge Disposition Home   Hospital Follow Up  Appt(s) scheduled? Yes   Discharge plans and expectations educations in teach back method with documentation complete? Yes   Right Care Referral Info   Post Acute Recommendation No Care   Post-Acute Status   Post-Acute Authorization Other   Other Status No Post-Acute Service Needs     Future Appointments   Date Time Provider Department Center   10/21/2020 10:00 AM [facility]   Ext: [phone]

## 2020-10-07 NOTE — NURSING
Patient voiced understanding of discharge instructions. Voiced understanding that all meds were called into his pharmacy per JUAN Chang for Dr Arreola. IV removed from left forearm, catheter intact, patient tolerated well. Patient aaox4 at the time of discharge, patient transported per w/c.

## 2020-10-07 NOTE — PLAN OF CARE
POC reviewed with pt. Pt verbalized understanding. AAOX'4. VSS on RA. Pt on low fiber/low residue diet tolerating well. No c/o nausea or vomiting. Pain managed with PRN pain meds as per MD's order. Pt ambulating halls throughout shift. Incisions are CDI. No acute events. Bed in lowest position with call light within . WCTM.

## 2020-10-09 ENCOUNTER — TELEPHONE (OUTPATIENT)
Dept: SURGERY | Facility: CLINIC | Age: 60
End: 2020-10-09

## 2020-10-09 RX ORDER — ALPRAZOLAM 0.5 MG/1
0.5 TABLET, EXTENDED RELEASE ORAL DAILY
Qty: 30 TABLET | Refills: 0 | Status: SHIPPED | OUTPATIENT
Start: 2020-10-09 | End: 2020-10-21

## 2020-10-09 NOTE — TELEPHONE ENCOUNTER
Having a hard time sleeping  No N/V.  No fever.    No severe abd pain.   Having bowel movements  Will try Xanax

## 2020-10-10 ENCOUNTER — NURSE TRIAGE (OUTPATIENT)
Dept: ADMINISTRATIVE | Facility: CLINIC | Age: 60
End: 2020-10-10

## 2020-10-10 ENCOUNTER — TELEPHONE (OUTPATIENT)
Dept: SURGERY | Facility: HOSPITAL | Age: 60
End: 2020-10-10

## 2020-10-10 RX ORDER — ESZOPICLONE 1 MG/1
1 TABLET, FILM COATED ORAL NIGHTLY
Qty: 14 TABLET | Refills: 1 | Status: SHIPPED | OUTPATIENT
Start: 2020-10-10 | End: 2020-10-21 | Stop reason: SDUPTHER

## 2020-10-10 NOTE — TELEPHONE ENCOUNTER
"    Reason for Disposition   Caller requesting a CONTROLLED substance prescription refill (e.g., narcotics, ADHD medicines)   Caller wants to use a complementary or alternative medicine    Protocols used: MEDICATION QUESTION CALL-AHEMANTH Butt states his pharmacy does not have the low dose of alprazolam that Dr Arreola ordered for him yesterday, and he is requesting a new order for a higher dose. He states his pharmacy will not have this strength in until Monday. States he is "off all the meds I was on before, and I am not sleeping well at all now".  Assured him will message Dr Arreola, with his request, but that controlled substances will not be ordered outside of clinic hours by another provider.  He states understanding.  Message to Dr Arreola.  Please contact caller directly with any additional care advice.    "

## 2020-10-12 ENCOUNTER — TELEPHONE (OUTPATIENT)
Dept: SURGERY | Facility: CLINIC | Age: 60
End: 2020-10-12

## 2020-10-12 NOTE — TELEPHONE ENCOUNTER
Spoke with patient. Requested to not send medications to a new pharmacy. He will  today from [facility].

## 2020-10-12 NOTE — TELEPHONE ENCOUNTER
----- Message from Marielena Valverde sent at 10/9/2020  5:51 PM CDT -----  Regarding: Patient advice  Contact: pt  Pt called in regards to medication that was sent to pharmacy       Pt stated that the pharmacy does not have the medicine and wanted to send to different pharmacy       Pt can be reached at [phone]

## 2020-10-13 ENCOUNTER — TELEPHONE (OUTPATIENT)
Dept: GASTROENTEROLOGY | Facility: CLINIC | Age: 60
End: 2020-10-13

## 2020-10-13 NOTE — TELEPHONE ENCOUNTER
----- Message from Eduin Allan MD sent at 10/12/2020  6:09 PM CDT -----  He's going to need a f/u with me in a couple of weeks. Thanks.

## 2020-10-13 NOTE — TELEPHONE ENCOUNTER
Called & spoke to pt  - Scheduled for same day appt. As Dr. Arreola 10/21/20 @3:30 since traveling from [place]

## 2020-10-15 ENCOUNTER — TELEPHONE (OUTPATIENT)
Dept: SURGERY | Facility: CLINIC | Age: 60
End: 2020-10-15

## 2020-10-15 LAB
FINAL PATHOLOGIC DIAGNOSIS: NORMAL
Lab: NORMAL

## 2020-10-21 ENCOUNTER — OFFICE VISIT (OUTPATIENT)
Dept: SURGERY | Facility: CLINIC | Age: 60
End: 2020-10-21
Payer: MEDICARE

## 2020-10-21 ENCOUNTER — OFFICE VISIT (OUTPATIENT)
Dept: GASTROENTEROLOGY | Facility: CLINIC | Age: 60
End: 2020-10-21
Payer: MEDICARE

## 2020-10-21 ENCOUNTER — TELEPHONE (OUTPATIENT)
Dept: GASTROENTEROLOGY | Facility: CLINIC | Age: 60
End: 2020-10-21

## 2020-10-21 VITALS
BODY MASS INDEX: 22.38 KG/M2 | WEIGHT: 151.13 LBS | HEIGHT: 69 IN | DIASTOLIC BLOOD PRESSURE: 72 MMHG | SYSTOLIC BLOOD PRESSURE: 119 MMHG

## 2020-10-21 VITALS
WEIGHT: 152.31 LBS | BODY MASS INDEX: 22.56 KG/M2 | DIASTOLIC BLOOD PRESSURE: 68 MMHG | TEMPERATURE: 98 F | HEIGHT: 69 IN | SYSTOLIC BLOOD PRESSURE: 102 MMHG | RESPIRATION RATE: 14 BRPM | OXYGEN SATURATION: 98 % | HEART RATE: 77 BPM

## 2020-10-21 DIAGNOSIS — G47.00 INSOMNIA, UNSPECIFIED TYPE: Primary | ICD-10-CM

## 2020-10-21 DIAGNOSIS — Z48.89 ENCOUNTER FOR POSTOPERATIVE WOUND CHECK: Primary | ICD-10-CM

## 2020-10-21 DIAGNOSIS — K50.812 CROHN'S DISEASE OF BOTH SMALL AND LARGE INTESTINE WITH INTESTINAL OBSTRUCTION: Primary | ICD-10-CM

## 2020-10-21 DIAGNOSIS — K50.012 CROHN'S DISEASE OF SMALL INTESTINE WITH INTESTINAL OBSTRUCTION: ICD-10-CM

## 2020-10-21 DIAGNOSIS — K56.699 STRICTURE OF SMALL INTESTINE: ICD-10-CM

## 2020-10-21 PROCEDURE — 3008F PR BODY MASS INDEX (BMI) DOCUMENTED: ICD-10-PCS | Mod: CPTII,S$GLB,, | Performed by: INTERNAL MEDICINE

## 2020-10-21 PROCEDURE — 99999 PR PBB SHADOW E&M-EST. PATIENT-LVL III: ICD-10-PCS | Mod: PBBFAC,,, | Performed by: COLON & RECTAL SURGERY

## 2020-10-21 PROCEDURE — 99024 POSTOP FOLLOW-UP VISIT: CPT | Mod: S$GLB,,, | Performed by: COLON & RECTAL SURGERY

## 2020-10-21 PROCEDURE — 99214 OFFICE O/P EST MOD 30 MIN: CPT | Mod: S$GLB,,, | Performed by: INTERNAL MEDICINE

## 2020-10-21 PROCEDURE — 99024 PR POST-OP FOLLOW-UP VISIT: ICD-10-PCS | Mod: S$GLB,,, | Performed by: COLON & RECTAL SURGERY

## 2020-10-21 PROCEDURE — 3008F BODY MASS INDEX DOCD: CPT | Mod: CPTII,S$GLB,, | Performed by: INTERNAL MEDICINE

## 2020-10-21 PROCEDURE — 99999 PR PBB SHADOW E&M-EST. PATIENT-LVL III: CPT | Mod: PBBFAC,,, | Performed by: COLON & RECTAL SURGERY

## 2020-10-21 PROCEDURE — 99214 PR OFFICE/OUTPT VISIT, EST, LEVL IV, 30-39 MIN: ICD-10-PCS | Mod: S$GLB,,, | Performed by: INTERNAL MEDICINE

## 2020-10-21 RX ORDER — EPINEPHRINE 1 MG/ML
0.3 INJECTION, SOLUTION, CONCENTRATE INTRAVENOUS
Status: CANCELLED | OUTPATIENT
Start: 2020-10-21

## 2020-10-21 RX ORDER — ACETAMINOPHEN 325 MG/1
650 TABLET ORAL ONCE
Status: CANCELLED | OUTPATIENT
Start: 2020-10-21

## 2020-10-21 RX ORDER — DIPHENHYDRAMINE HYDROCHLORIDE 50 MG/ML
25 INJECTION INTRAMUSCULAR; INTRAVENOUS
Status: CANCELLED | OUTPATIENT
Start: 2020-10-21

## 2020-10-21 RX ORDER — METHYLPREDNISOLONE SOD SUCC 125 MG
40 VIAL (EA) INJECTION
Status: CANCELLED | OUTPATIENT
Start: 2020-10-21

## 2020-10-21 RX ORDER — IPRATROPIUM BROMIDE AND ALBUTEROL SULFATE 2.5; .5 MG/3ML; MG/3ML
3 SOLUTION RESPIRATORY (INHALATION)
Status: CANCELLED | OUTPATIENT
Start: 2020-10-21

## 2020-10-21 RX ORDER — SODIUM CHLORIDE 0.9 % (FLUSH) 0.9 %
10 SYRINGE (ML) INJECTION
Status: CANCELLED | OUTPATIENT
Start: 2020-10-21

## 2020-10-21 RX ORDER — HEPARIN 100 UNIT/ML
500 SYRINGE INTRAVENOUS
Status: CANCELLED | OUTPATIENT
Start: 2020-10-21

## 2020-10-21 RX ORDER — HYDROCODONE BITARTRATE AND ACETAMINOPHEN 10; 325 MG/1; MG/1
1 TABLET ORAL
COMMUNITY

## 2020-10-21 RX ORDER — ACETAMINOPHEN 325 MG/1
650 TABLET ORAL
Status: CANCELLED | OUTPATIENT
Start: 2020-10-21

## 2020-10-21 RX ORDER — CETIRIZINE HYDROCHLORIDE 10 MG/1
10 TABLET ORAL ONCE
Status: CANCELLED | OUTPATIENT
Start: 2020-10-21

## 2020-10-21 RX ORDER — ESZOPICLONE 1 MG/1
1 TABLET, FILM COATED ORAL NIGHTLY
Qty: 14 TABLET | Refills: 0 | Status: SHIPPED | OUTPATIENT
Start: 2020-10-21 | End: 2020-10-21

## 2020-10-21 NOTE — PROGRESS NOTES
[facility]          Inflammatory Bowel Disease follow-up Consultation Note         TODAY'S VISIT DATE:  10/21/2020    Reason for Consult:    Chief Complaint   Patient presents with    Crohn's Disease       PCP: Brayan Serrano      Referring MD:   No ref. provider found    History of Present Illness:  Daquan Jansen who is a 60 y.o. male is being seen today at the [facility] on 10/21/2020 for inflammatory bowel disease- Crohn's disease.  He underwent surgery on October 2nd.  Since that time he has been doing pretty well.  He denies any abdominal pain, nausea, vomiting.  He does report having 2 or 3 loose bowel movements per day.  The stools burn whenever he passes them.  He denies any other complaints today other than a lack of sleep caused by some hip issues.    IBD History:  He has a history of ileal Crohn's disease that was diagnosed in the mid 90s.  As far back as 1999 colonoscopy reports have noted a stricture at the ileocecal valve that will not allow even a pediatric colonoscope to pass into the terminal ileum.  His medical treatment has been somewhat erratic because of lapses in insurance coverage over the years.  He has had good response is to steroids in the past.  After being on steroids he was placed on azathioprine and infliximab.  He responded well to infliximab initially but after several months he felt like it was not helping anymore and he was having a return of some of his abdominal pain.  He was then started on Humira.  He had an initial response to this as well but later felt like he lost response.  He went for several years without being on any medical therapy. In early 2018 he was started on azathioprine and later started on infliximab (2nd time this drug was used).    It is noted that when he was restarted on infliximab he did have a minor infusion reaction during his 3rd infusion but had never had any other issues  since that time.    Based on the notes it sounds like he had a good response to this therapy although there were some issues with low 6 TG levels concerning for medication non adherence.  In August of 2019 he had another colonoscopy that again revealed edema of the ileocecal valve and stenosis that did not allow for passage of the colonoscope into the ileum.  He also had diverticulosis from the transverse colon distally and some small hemorrhoids.  Biopsies of the ileocecal valve did not show any active inflammatory changes.  In September 2019 he was referred to colorectal surgery for possible resection of the IC valve stricture.  An MR enterography was done which showed some changes concerning for active inflammatory disease but there was no evidence of proximal dilation of the bowel and no clear evidence of a stricture.  He was referred for further medical management but he never came to appointment.  He was seen by his primary gastroenterologist in April of this year and it was noted that he had been off of infliximab for for the last 4 months.  As of June it does not appear that this had been restarted.  Infliximab was restarted about 2 weeks ago at a dose of 5 milligrams/kilogram.  He is scheduled for his next dose in 8 weeks (not really loaded).  After his initial visit it was found that he had developed high levels of antibodies to infliximab.  A colonoscopy revealed a severe stricture IC valve.  A CT enterography confirm inflammatory changes at this site.  He underwent surgical intervention October 2nd and has been recovering very well ever since.    IBD Details:  Dx Date:  1995  Disease type/distribution:  Crohn's disease/Ileal disease  Current Treatment:  None  Start Date:   Response:    Optimized:    Adverse reactions:    Prior surgeries:  Ileocecectomy  CRP Elevation:  Yes  Disease Complications:  IC valve stricture  Extraintestinal manifestations:  None  Prior treatments:   Steroids:  Good response but  not with most recent round of prednisone  5ASA:  Was on sulfasalazine at 1 point without response  IMM:  Azathioprine-no significant issues, not taking currently-normal TPMT activity  TNF Inh:  Previously on Humira-loss of response (not clear of antibodies checked); infliximab-multiple courses, developed antibodies   Anti-Integrin:  None   IL 12/23:  None  KYLEIGH Inh:  None    Previous Clinical Trials:  None    Last Colonoscopy:  September 2020-severe IC valve stricture    Other Endoscopies:  EGD many years ago    Imaging:   MRE:  September 2019-inflammation of a 4 cm segment of the terminal ileum, no upstream dilation, no clear fibrotic stricture   CT:  CT enterography from September 2020 with stricture and inflammatory changes   Other:  None    Pertinent Labs:  Lab Results   Component Value Date    CRP 28.9 (H) 10/07/2020     No results found for: TTGIGA, IGA  No results found for: TSH, FREET4  Lab Results   Component Value Date    VFNZRMYW90BG 36 08/25/2020    ERFQGGVE42 451 08/25/2020     Lab Results   Component Value Date    HEPBSAG Negative 08/25/2020    HEPBCAB Negative 08/25/2020    HEPCAB Negative 08/25/2020     Lab Results   Component Value Date    NRJ60BRPS Negative 08/25/2020     Lab Results   Component Value Date    NIL 0.040 08/25/2020    MITOGENNIL 8.350 08/25/2020     No results found for: TPTMINTERP, TPMTRESULT  No results found for: STOOLCULTURE, PLMHPTPINJ6S, ZMTVRVFGNH2Q, CDIFFICILEAN, CDIFFTOX, CDIFFICILEBY  No results found for: CALPROTECTIN    Therapeutic Drug Monitoring Labs:  No results found for: PROMETH  No results found for: ANSADAINIT, INFLIXIMAB, INFLIXINTERP    Vaccinations:  Lab Results   Component Value Date    HEPBSAB Negative 08/25/2020     Lab Results   Component Value Date    HEPAIGG Negative 08/25/2020     Lab Results   Component Value Date    VARICELLAZOS 3.45 (H) 08/25/2020    VARICELLAINT Positive (A) 08/25/2020       There is no immunization history on file for this  patient.      Review of Systems  Review of Systems   Constitutional: Negative for chills, fever and weight loss.   HENT: Negative for sore throat.    Eyes: Negative for pain, discharge and redness.   Respiratory: Negative for cough, shortness of breath and wheezing.    Cardiovascular: Negative for chest pain and leg swelling.   Gastrointestinal: Negative for abdominal pain, blood in stool, constipation, diarrhea, heartburn, melena, nausea and vomiting.   Genitourinary: Negative for dysuria and frequency.   Musculoskeletal: Negative for back pain, joint pain and myalgias.   Skin: Negative for rash.   Neurological: Negative for focal weakness and seizures.   Endo/Heme/Allergies: Does not bruise/bleed easily.   Psychiatric/Behavioral: Positive for depression. The patient is not nervous/anxious.        Medical History:   Past Medical History:   Diagnosis Date    Arthritis - hip     Chronic pain     Chronic, continuous use of opioids     Colon polyp 1994    Doctor said had a few small ones nothing to worry about    Crohn's disease     Food intolerance 1995    Anything with seeds    Hyperlipemia     Restless leg syndrome     Spinal stenosis        Surgical History:  Past Surgical History:   Procedure Laterality Date    APPENDECTOMY      Open, 29 years ago    COLONOSCOPY  Had a lot of them    None    COLONOSCOPY N/A 9/8/2020    Procedure: COLONOSCOPY;  Surgeon: Eduin Allan MD;  Location: [facility] ([address] [place] [address]);  Service: Endoscopy;  Laterality: N/A;  COVID test at [place] on 9/5-GT    LAPAROSCOPIC ILEOCOLIC RESECTION N/A 10/2/2020    Procedure: LAPAROSCOPIC ILEOCOLIC RESECTION;  Surgeon: DIAN Arreola MD;  Location: [address];  Service: Colon and Rectal;  Laterality: N/A;    LUMBAR FUSION      UPPER GASTROINTESTINAL ENDOSCOPY  Dont remember    In hospital recordes       Family History:   Family History   Problem Relation Age of Onset    Heart disease Mother     Bone cancer Father      Diabetes Brother        Social History:   Social History     Tobacco Use    Smoking status: Former Smoker     Packs/day: 1.50     Years: 45.00     Pack years: 67.50     Types: Cigarettes, Cigars     Start date: 1975     Quit date: 2002     Years since quittin.1    Smokeless tobacco: Current User     Types: Chew    Tobacco comment: Still dip   Substance Use Topics    Alcohol use: Never     Frequency: Never     Drinks per session: Patient refused     Binge frequency: Never    Drug use: Never       Allergies: Reviewed    Home Medications:   Medication List with Changes/Refills   Current Medications    BUPRENORPHINE (BUTRANS) 10 MCG/HOUR PTWK        CHOLECALCIFEROL, VITAMIN D3, (VITAMIN D3) 50 MCG (2,000 UNIT) CAP    Take 1 capsule by mouth once daily.    HYDROCODONE-ACETAMINOPHEN (NORCO)  MG PER TABLET    Take 1 tablet by mouth. Takes as needed for hip pain    METOPROLOL SUCCINATE (TOPROL-XL) 25 MG 24 HR TABLET    Take 25 mg by mouth once daily.    UNABLE TO FIND    Smarty Pants Men's formula    WHEY PROTEIN CONCEN/AMINO ACID (WHEY PROTEIN CONCENTRATE ORAL)    Take by mouth.   Discontinued Medications    ALPRAZOLAM (XANAX XR) 0.5 MG TB24    Take 1 tablet (0.5 mg total) by mouth once daily.    ATORVASTATIN (LIPITOR) 40 MG TABLET    Take 40 mg by mouth once daily.    COENZYME Q10 200 MG CAPSULE    Take 200 mg by mouth once daily.    ESZOPICLONE (LUNESTA) 1 MG TAB    Take 1 tablet (1 mg total) by mouth nightly. for 14 days    INFLIXIMAB (REMICADE IV)    Inject into the vein.    ONDANSETRON (ZOFRAN) 8 MG TABLET    Take 1 tablet (8 mg total) by mouth every 8 (eight) hours as needed for Nausea.    ONDANSETRON (ZOFRAN-ODT) 4 MG TBDL    Take 2 tablets (8 mg total) by mouth every 6 (six) hours as needed.    OXYCODONE (ROXICODONE) 5 MG IMMEDIATE RELEASE TABLET    Take 1 tablet (5 mg total) by mouth every 4 (four) hours as needed for Pain.    ROSUVASTATIN (CRESTOR) 20 MG TABLET    Take 20 mg by mouth once  "daily.       Physical Exam:  Vital Signs:  /68 (BP Location: Left arm, Patient Position: Sitting)   Pulse 77   Temp 97.9 °F (36.6 °C)   Resp 14   Ht 5' 9" (1.753 m)   Wt 69.1 kg (152 lb 5.4 oz)   SpO2 98%   BMI 22.50 kg/m²   Body mass index is 22.5 kg/m².    Physical Exam   Constitutional: He is oriented to person, place, and time. He appears well-developed and well-nourished. No distress.   HENT:   Head: Normocephalic and atraumatic.   Eyes: Pupils are equal, round, and reactive to light. No scleral icterus.   Neck: No thyromegaly present.   Cardiovascular: Normal rate and regular rhythm. Exam reveals no friction rub.   No murmur heard.  Pulmonary/Chest: Effort normal and breath sounds normal. He has no wheezes. He has no rales.   Abdominal: Soft. Bowel sounds are normal. He exhibits no distension and no mass. There is no abdominal tenderness. There is no rebound and no guarding.   Musculoskeletal:         General: No edema.   Lymphadenopathy:     He has no cervical adenopathy.   Neurological: He is alert and oriented to person, place, and time.   Skin: No rash noted.   Psychiatric: He has a normal mood and affect. His behavior is normal. Judgment and thought content normal.   Nursing note and vitals reviewed.      Labs: reviewed and pertinent noted above  July 2020: mild normocytic anemia, normal PT/INR, normal CMP (albumin 4.3).  CRP equals 1.0, normal UA  February 2018:  Negative T spot  Normal TPMT activity    Assessment/Plan:  Daquan Jansen is a 60 y.o. male with ileal Crohn's disease complicated by stricture.. The following issues were addresssed:    1. Crohn's disease of both small and large intestine with intestinal obstruction      1.  Crohn's disease:  He is doing quite well since the time of his surgery.  Today we discussed strategies for postoperative prophylaxis.  Ideally we would use a TNF inhibitor but he has developed antibodies to infliximab and lost response to Humira.  We " discussed the use of either Stelara or Entyvio.  We will plan to start Entyvio in the near future.  I have placed a call to his gastroenterologist in [place] to discuss starting Entyvio infusions.  We will follow up in about 3 months.      Ex smoker:  - recommended to continue smoking cessation  - discussed in detail that Crohn's disease can worsen with smoking and may make patient more resistant to treatment    # IBD specific health maintenance:  Colon cancer surveillance:  No colonic disease    Annual:  - Eye exam:  Not done  - Skin exam (if on IMM/TNF):  Not done  - reminded pt to use sunblock/hats/sunprotective clothing  - PAP (if immunosuppressed):  Not applicable    DEXA:  Not applicable    Vitamin D:  Check today    Vaccines:   Patient unsure about vaccines-will bring records next visit    Follow up: Follow up in about 3 months (around 1/21/2021).    Thank you again for sending Daquan Justyna to see Dr. Brando Allan today at the [facility]. Please don't hesitate to contact Dr. Allan if there are any questions regarding this evaluation, or if you have any other patients with inflammatory bowel disease for whom you would like a consultation. You can reach Dr. Allan at [phone] or by email at [email or web address]    Eduin Allan MD  Department of Gastroenterology  Inflammatory Bowel Disease          Answers for HPI/ROS submitted by the patient on 8/22/2020   mouth sores: No  trouble swallowing: No  Joint pain? : Yes      Answers for HPI/ROS submitted by the patient on 10/21/2020   Joint pain? : Yes

## 2020-10-21 NOTE — TELEPHONE ENCOUNTER
Call & spoke with pt  -explained dr cruz full  -pt expressed understanding     ----- Message from Loraine Yepez sent at 10/21/2020 10:23 AM CDT -----  Regarding: Appointment  Contact: [phone]  Calling in regards to coming in early for appointment since he is already on campus. Please call

## 2020-10-21 NOTE — PROGRESS NOTES
HPI:  Daquan Jansen is a 60 y.o. male with history of ileocolic resection for Crohn's disease with KONO S anastomosis 10-2-2020.     [facility] DIAGNOSIS:   A. SMALL BOWEL AND COLON, ILEOCOLIC RESECTION:   -Segment of colon and ileum showing submucosal fibrosis with chronic   inflammation including rare granulomas, consistent with clinical history of   Crohn's disease.   -Surgical margin viable and unremarkable.   -No microorganisms identified with PAS-f and GMS fungal stains or acid fast   bacillus stain.   -Multiple levels examined.   B. PORTION OF OMENTUM, RESECTION:   -Portions of benign adipose tissue consistent with omentum showing vascular   congestion and mild chronic inflammation.   Kayla Olsen M.D.   Report attached.   Performing site:   [address] [address]     Interval history  The patient reports that his pain is improving.  He had a lot of problems sleeping at night but this is better.  States that this is related to his restless leg syndrome and chronic left leg pain.  Apparently needs his left hip replaced.     He denies any fevers or chills.  He denies any rectal bleeding.  His appetite and energy levels are slowly improving.  His bowel movements are formed 2-3 per day.  Denies any blood.  Most importantly, the pain that he experienced when he would eat has completely resolved.      Past Medical History:   Diagnosis Date    Arthritis - hip     Chronic pain     Chronic, continuous use of opioids     Colon polyp 1994    Doctor said had a few small ones nothing to worry about    Crohn's disease     Food intolerance 1995    Anything with seeds    Hyperlipemia     Restless leg syndrome     Spinal stenosis         Past Surgical History:   Procedure Laterality Date    APPENDECTOMY      Open, 29 years ago    COLONOSCOPY  Had a lot of them    None    COLONOSCOPY N/A 9/8/2020    Procedure: COLONOSCOPY;  Surgeon: Eduin Allan MD;   Location: [facility] (4TH FLR);  Service: Endoscopy;  Laterality: N/A;  COVID test at [place] on     LAPAROSCOPIC ILEOCOLIC RESECTION N/A 10/2/2020    Procedure: LAPAROSCOPIC ILEOCOLIC RESECTION;  Surgeon: DIAN Arreola MD;  Location: [facility] OR 2ND FLR;  Service: Colon and Rectal;  Laterality: N/A;    LUMBAR FUSION      UPPER GASTROINTESTINAL ENDOSCOPY  Dont remember    In hospital recordes       Review of patient's allergies indicates:  No Known Allergies    Family History   Problem Relation Age of Onset    Heart disease Mother     Bone cancer Father     Diabetes Brother        Social History     Socioeconomic History    Marital status:      Spouse name: Not on file    Number of children: Not on file    Years of education: Not on file    Highest education level: Not on file   Occupational History    Not on file   Social Needs    Financial resource strain: Not very hard    Food insecurity     Worry: Never true     Inability: Never true    Transportation needs     Medical: No     Non-medical: No   Tobacco Use    Smoking status: Former Smoker     Packs/day: 1.50     Years: 45.00     Pack years: 67.50     Types: Cigarettes, Cigars     Start date: 1975     Quit date: 2002     Years since quittin.1    Smokeless tobacco: Current User     Types: Chew    Tobacco comment: Still dip   Substance and Sexual Activity    Alcohol use: Never     Frequency: Never     Drinks per session: Patient refused     Binge frequency: Never    Drug use: Never    Sexual activity: Not Currently     Partners: Female     Birth control/protection: None     Comment: None   Lifestyle    Physical activity     Days per week: 5 days     Minutes per session: 150+ min    Stress: Only a little   Relationships    Social connections     Talks on phone: More than three times a week     Gets together: Once a week     Attends Mandaen service: Not on file     Active member of club or organization: No     Attends  "meetings of clubs or organizations: Never     Relationship status:    Other Topics Concern    Not on file   Social History Narrative    Not on file       ROS:  GENERAL: No fever, chills, fatigability or weight loss.  Integument: No rashes, redness, icterus  CHEST: Denies GARCIA, cyanosis, wheezing, cough and sputum production.  CARDIOVASCULAR: Denies chest pain, PND, orthopnea or reduced exercise tolerance.  GI: Denies abd pain, dysphagia, nausea, vomiting, no hematemesis   : Denies burning on urination, no hematuria, no bacteriuria  MSK: No deformities, swelling, joint pain swelling  Neurologic: No HAs, seizures, weakness, paresthesias, gait problems    PE:  General appearance well  /72 (BP Location: Left arm, Patient Position: Sitting, BP Method: Large (Manual))   Ht 5' 9" (1.753 m)   Wt 68.5 kg (151 lb 1.6 oz)   BMI 22.31 kg/m²   Sclera/ Skin anicteric  AT NC EOMI  Neck supple trachea midline   Chest symmetric, nl excursion, no retractions, breathing comfortably  Abdomen      Was healed primarily without any signs of infection  ND soft NT.  no masses, no organomegaly  EXT - no CCE  Neuro:  Mood/ affect nl, alert and oriented x 3, moves all ext's, gait nl      Assessment:  Wounds healed primarily  GI function returned without complication  H/o fibrostenosing ileal Crohn's disease  Status ileocolic resection with KONO S anastomosis    Plan:  Follow up with GI for considering of post operative medical therapy.    OV prn  Surveillance colonoscopy per Dr Allan in 6-12 months      "

## 2021-02-27 ENCOUNTER — PATIENT MESSAGE (OUTPATIENT)
Dept: GASTROENTEROLOGY | Facility: CLINIC | Age: 61
End: 2021-02-27

## 2021-03-01 ENCOUNTER — PATIENT MESSAGE (OUTPATIENT)
Dept: GASTROENTEROLOGY | Facility: CLINIC | Age: 61
End: 2021-03-01

## (undated) DEVICE — ADHESIVE DERMABOND ADVANCED

## (undated) DEVICE — SUT VICRYL+ 2-0 SH 18IN

## (undated) DEVICE — RELOAD PROXIMATE CUT BLUE 75MM

## (undated) DEVICE — SEE MEDLINE ITEM 157144

## (undated) DEVICE — DRAPE ABDOMINAL TIBURON 14X11

## (undated) DEVICE — SCISSOR 5MMX35CM DIRECT DRIVE

## (undated) DEVICE — ELECTRODE REM PLYHSV RETURN 9

## (undated) DEVICE — SPONGE LAP 18X18 PREWASHED

## (undated) DEVICE — CUTTER PROXIMATE BLUE 75MM

## (undated) DEVICE — SUT VICRYL 3-0 27 SH

## (undated) DEVICE — SEE MEDLINE ITEM 152487

## (undated) DEVICE — NDL 22GA X1 1/2 REG BEVEL

## (undated) DEVICE — SYR ONLY LUER LOCK 20CC

## (undated) DEVICE — CONTAINER SPECIMEN STRL 4OZ

## (undated) DEVICE — TRAY FOLEY 16FR INFECTION CONT

## (undated) DEVICE — SEE MEDLINE ITEM 157117

## (undated) DEVICE — SUT 1 36IN PDS II VIO MONO

## (undated) DEVICE — Device

## (undated) DEVICE — SEE MEDLINE ITEM 146417

## (undated) DEVICE — SUT CTD VICRYL VIL BR CR/SH

## (undated) DEVICE — IRRIGATOR ENDOSCOPY DISP.

## (undated) DEVICE — POUCH SENSURA MIO 3/8X2 1/8IN

## (undated) DEVICE — KIT GELPORT LAPAROSCOPIC ABD

## (undated) DEVICE — NDL BOX COUNTER

## (undated) DEVICE — CLIPPER BLADE MOD 4406 (CAREF)

## (undated) DEVICE — TROCAR ENDOPATH XCEL 5X75MM

## (undated) DEVICE — SEE MEDLINE ITEM 146347

## (undated) DEVICE — SUT MCRYL PLUS 4-0 PS2 27IN

## (undated) DEVICE — SUT 3-0 VICRYL SH CR/8 18

## (undated) DEVICE — SET DECANTER MEDICHOICE

## (undated) DEVICE — COVER LIGHT HANDLE 80/CA

## (undated) DEVICE — SUT CTD VICRYL 2-0 VIL BR

## (undated) DEVICE — SUT VICRYL PLUS 0 CT1 18IN

## (undated) DEVICE — SEE MEDLINE ITEM 156902

## (undated) DEVICE — SUT 0 18IN COATED VICRYL V